# Patient Record
Sex: FEMALE | Race: WHITE | NOT HISPANIC OR LATINO | Employment: UNEMPLOYED | URBAN - METROPOLITAN AREA
[De-identification: names, ages, dates, MRNs, and addresses within clinical notes are randomized per-mention and may not be internally consistent; named-entity substitution may affect disease eponyms.]

---

## 2020-06-17 ENCOUNTER — TELEPHONE (OUTPATIENT)
Dept: RHEUMATOLOGY | Facility: CLINIC | Age: 45
End: 2020-06-17

## 2020-06-17 NOTE — TELEPHONE ENCOUNTER
Patient advised that Dr. Madera is not taking any new outside medicaid at this time. She was informed that she would need a referral placed by an [facility] provider being that she is new patient. Patient was given the phone number to patient relations per request

## 2020-06-17 NOTE — TELEPHONE ENCOUNTER
----- Message from Ana Peraza sent at 6/17/2020  2:09 PM CDT -----  Regarding: New Patient Appointment  Type:  New Patient Appointment Request    Caller is requesting a same day appointment.  Caller declined first available appointment listed below.      Name of Caller:  Patient  When is the first available appointment?  N/a  Symptoms:  arthritis.  Best Call Back Number:  [phone]  Additional Information:      States DIAN Solomon sent over a referral yesterday.

## 2022-01-20 ENCOUNTER — TELEPHONE (OUTPATIENT)
Dept: GASTROENTEROLOGY | Facility: CLINIC | Age: 47
End: 2022-01-20
Payer: MEDICAID

## 2022-01-20 ENCOUNTER — LAB VISIT (OUTPATIENT)
Dept: PRIMARY CARE CLINIC | Facility: CLINIC | Age: 47
End: 2022-01-20
Payer: MEDICAID

## 2022-01-20 ENCOUNTER — PATIENT MESSAGE (OUTPATIENT)
Dept: RHEUMATOLOGY | Facility: CLINIC | Age: 47
End: 2022-01-20
Payer: MEDICAID

## 2022-01-20 ENCOUNTER — PATIENT MESSAGE (OUTPATIENT)
Dept: PRIMARY CARE CLINIC | Facility: CLINIC | Age: 47
End: 2022-01-20

## 2022-01-20 ENCOUNTER — TELEPHONE (OUTPATIENT)
Dept: RHEUMATOLOGY | Facility: CLINIC | Age: 47
End: 2022-01-20
Payer: MEDICAID

## 2022-01-20 ENCOUNTER — OFFICE VISIT (OUTPATIENT)
Dept: PRIMARY CARE CLINIC | Facility: CLINIC | Age: 47
End: 2022-01-20
Payer: MEDICAID

## 2022-01-20 VITALS
OXYGEN SATURATION: 97 % | WEIGHT: 251.31 LBS | BODY MASS INDEX: 41.87 KG/M2 | SYSTOLIC BLOOD PRESSURE: 130 MMHG | HEIGHT: 65 IN | DIASTOLIC BLOOD PRESSURE: 84 MMHG | HEART RATE: 94 BPM

## 2022-01-20 DIAGNOSIS — Z12.31 SCREENING MAMMOGRAM, ENCOUNTER FOR: ICD-10-CM

## 2022-01-20 DIAGNOSIS — Z12.11 COLON CANCER SCREENING: ICD-10-CM

## 2022-01-20 DIAGNOSIS — Z00.00 ANNUAL PHYSICAL EXAM: ICD-10-CM

## 2022-01-20 DIAGNOSIS — M54.50 CHRONIC MIDLINE LOW BACK PAIN, UNSPECIFIED WHETHER SCIATICA PRESENT: ICD-10-CM

## 2022-01-20 DIAGNOSIS — L40.50 PSORIATIC ARTHRITIS: ICD-10-CM

## 2022-01-20 DIAGNOSIS — K42.9 UMBILICAL HERNIA WITHOUT OBSTRUCTION AND WITHOUT GANGRENE: ICD-10-CM

## 2022-01-20 DIAGNOSIS — M79.7 FIBROMYALGIA: ICD-10-CM

## 2022-01-20 DIAGNOSIS — E03.9 HYPOTHYROIDISM, UNSPECIFIED TYPE: ICD-10-CM

## 2022-01-20 DIAGNOSIS — M25.559 HIP PAIN: ICD-10-CM

## 2022-01-20 DIAGNOSIS — L40.50 PSORIATIC ARTHRITIS: Primary | ICD-10-CM

## 2022-01-20 DIAGNOSIS — G89.4 CHRONIC PAIN SYNDROME: ICD-10-CM

## 2022-01-20 DIAGNOSIS — G89.29 CHRONIC MIDLINE LOW BACK PAIN, UNSPECIFIED WHETHER SCIATICA PRESENT: ICD-10-CM

## 2022-01-20 DIAGNOSIS — F41.1 GAD (GENERALIZED ANXIETY DISORDER): ICD-10-CM

## 2022-01-20 DIAGNOSIS — F33.2 SEVERE EPISODE OF RECURRENT MAJOR DEPRESSIVE DISORDER, WITHOUT PSYCHOTIC FEATURES: ICD-10-CM

## 2022-01-20 DIAGNOSIS — G43.809 OTHER MIGRAINE WITHOUT STATUS MIGRAINOSUS, NOT INTRACTABLE: ICD-10-CM

## 2022-01-20 DIAGNOSIS — Z23 ENCOUNTER FOR ADMINISTRATION OF VACCINE: ICD-10-CM

## 2022-01-20 PROBLEM — R70.0 ESR RAISED: Status: ACTIVE | Noted: 2022-01-20

## 2022-01-20 LAB
ALBUMIN SERPL BCP-MCNC: 3.1 G/DL (ref 3.5–5.2)
ALP SERPL-CCNC: 164 U/L (ref 55–135)
ALT SERPL W/O P-5'-P-CCNC: 11 U/L (ref 10–44)
ANION GAP SERPL CALC-SCNC: 10 MMOL/L (ref 8–16)
AST SERPL-CCNC: 8 U/L (ref 10–40)
BASOPHILS # BLD AUTO: 0.05 K/UL (ref 0–0.2)
BASOPHILS NFR BLD: 0.4 % (ref 0–1.9)
BILIRUB SERPL-MCNC: 0.3 MG/DL (ref 0.1–1)
BUN SERPL-MCNC: 15 MG/DL (ref 6–20)
CALCIUM SERPL-MCNC: 9.3 MG/DL (ref 8.7–10.5)
CHLORIDE SERPL-SCNC: 99 MMOL/L (ref 95–110)
CHOLEST SERPL-MCNC: 147 MG/DL (ref 120–199)
CHOLEST/HDLC SERPL: 2.8 {RATIO} (ref 2–5)
CO2 SERPL-SCNC: 30 MMOL/L (ref 23–29)
CREAT SERPL-MCNC: 0.7 MG/DL (ref 0.5–1.4)
CRP SERPL-MCNC: 27.7 MG/L (ref 0–8.2)
DIFFERENTIAL METHOD: ABNORMAL
EOSINOPHIL # BLD AUTO: 0.1 K/UL (ref 0–0.5)
EOSINOPHIL NFR BLD: 0.5 % (ref 0–8)
ERYTHROCYTE [DISTWIDTH] IN BLOOD BY AUTOMATED COUNT: 15.4 % (ref 11.5–14.5)
ERYTHROCYTE [SEDIMENTATION RATE] IN BLOOD BY WESTERGREN METHOD: 94 MM/HR (ref 0–36)
EST. GFR  (AFRICAN AMERICAN): >60 ML/MIN/1.73 M^2
EST. GFR  (NON AFRICAN AMERICAN): >60 ML/MIN/1.73 M^2
ESTIMATED AVG GLUCOSE: 103 MG/DL (ref 68–131)
GLUCOSE SERPL-MCNC: 77 MG/DL (ref 70–110)
HBA1C MFR BLD: 5.2 % (ref 4–5.6)
HCT VFR BLD AUTO: 41 % (ref 37–48.5)
HDLC SERPL-MCNC: 52 MG/DL (ref 40–75)
HDLC SERPL: 35.4 % (ref 20–50)
HGB BLD-MCNC: 12.9 G/DL (ref 12–16)
IMM GRANULOCYTES # BLD AUTO: 0.08 K/UL (ref 0–0.04)
IMM GRANULOCYTES NFR BLD AUTO: 0.6 % (ref 0–0.5)
LDLC SERPL CALC-MCNC: 81.4 MG/DL (ref 63–159)
LYMPHOCYTES # BLD AUTO: 3.1 K/UL (ref 1–4.8)
LYMPHOCYTES NFR BLD: 24.1 % (ref 18–48)
MCH RBC QN AUTO: 30.1 PG (ref 27–31)
MCHC RBC AUTO-ENTMCNC: 31.5 G/DL (ref 32–36)
MCV RBC AUTO: 96 FL (ref 82–98)
MONOCYTES # BLD AUTO: 0.6 K/UL (ref 0.3–1)
MONOCYTES NFR BLD: 4.5 % (ref 4–15)
NEUTROPHILS # BLD AUTO: 9 K/UL (ref 1.8–7.7)
NEUTROPHILS NFR BLD: 69.9 % (ref 38–73)
NONHDLC SERPL-MCNC: 95 MG/DL
NRBC BLD-RTO: 0 /100 WBC
PLATELET # BLD AUTO: 409 K/UL (ref 150–450)
PMV BLD AUTO: 9.9 FL (ref 9.2–12.9)
POTASSIUM SERPL-SCNC: 5 MMOL/L (ref 3.5–5.1)
PROT SERPL-MCNC: 7.3 G/DL (ref 6–8.4)
RBC # BLD AUTO: 4.29 M/UL (ref 4–5.4)
SODIUM SERPL-SCNC: 139 MMOL/L (ref 136–145)
T4 FREE SERPL-MCNC: 0.92 NG/DL (ref 0.71–1.51)
TRIGL SERPL-MCNC: 68 MG/DL (ref 30–150)
TSH SERPL DL<=0.005 MIU/L-ACNC: 5.58 UIU/ML (ref 0.4–4)
WBC # BLD AUTO: 12.89 K/UL (ref 3.9–12.7)

## 2022-01-20 PROCEDURE — 80053 COMPREHEN METABOLIC PANEL: CPT | Performed by: FAMILY MEDICINE

## 2022-01-20 PROCEDURE — 3075F PR MOST RECENT SYSTOLIC BLOOD PRESS GE 130-139MM HG: ICD-10-PCS | Mod: CPTII,,, | Performed by: FAMILY MEDICINE

## 2022-01-20 PROCEDURE — 99204 PR OFFICE/OUTPT VISIT, NEW, LEVL IV, 45-59 MIN: ICD-10-PCS | Mod: S$PBB,,, | Performed by: FAMILY MEDICINE

## 2022-01-20 PROCEDURE — 3008F PR BODY MASS INDEX (BMI) DOCUMENTED: ICD-10-PCS | Mod: CPTII,,, | Performed by: FAMILY MEDICINE

## 2022-01-20 PROCEDURE — 1160F PR REVIEW ALL MEDS BY PRESCRIBER/CLIN PHARMACIST DOCUMENTED: ICD-10-PCS | Mod: CPTII,,, | Performed by: FAMILY MEDICINE

## 2022-01-20 PROCEDURE — 80061 LIPID PANEL: CPT | Performed by: FAMILY MEDICINE

## 2022-01-20 PROCEDURE — 84443 ASSAY THYROID STIM HORMONE: CPT | Performed by: FAMILY MEDICINE

## 2022-01-20 PROCEDURE — 1160F RVW MEDS BY RX/DR IN RCRD: CPT | Mod: CPTII,,, | Performed by: FAMILY MEDICINE

## 2022-01-20 PROCEDURE — 36415 COLL VENOUS BLD VENIPUNCTURE: CPT | Mod: PBBFAC,PN

## 2022-01-20 PROCEDURE — 3079F PR MOST RECENT DIASTOLIC BLOOD PRESSURE 80-89 MM HG: ICD-10-PCS | Mod: CPTII,,, | Performed by: FAMILY MEDICINE

## 2022-01-20 PROCEDURE — 99999 PR PBB SHADOW E&M-EST. PATIENT-LVL IV: CPT | Mod: PBBFAC,,, | Performed by: FAMILY MEDICINE

## 2022-01-20 PROCEDURE — 99999 PR PBB SHADOW E&M-EST. PATIENT-LVL IV: ICD-10-PCS | Mod: PBBFAC,,, | Performed by: FAMILY MEDICINE

## 2022-01-20 PROCEDURE — 3075F SYST BP GE 130 - 139MM HG: CPT | Mod: CPTII,,, | Performed by: FAMILY MEDICINE

## 2022-01-20 PROCEDURE — 90471 IMMUNIZATION ADMIN: CPT | Mod: PBBFAC,PN

## 2022-01-20 PROCEDURE — 3079F DIAST BP 80-89 MM HG: CPT | Mod: CPTII,,, | Performed by: FAMILY MEDICINE

## 2022-01-20 PROCEDURE — 85025 COMPLETE CBC W/AUTO DIFF WBC: CPT | Performed by: FAMILY MEDICINE

## 2022-01-20 PROCEDURE — 84439 ASSAY OF FREE THYROXINE: CPT | Performed by: FAMILY MEDICINE

## 2022-01-20 PROCEDURE — 99214 OFFICE O/P EST MOD 30 MIN: CPT | Mod: PBBFAC,PN | Performed by: FAMILY MEDICINE

## 2022-01-20 PROCEDURE — 90714 TD VACC NO PRESV 7 YRS+ IM: CPT | Mod: PBBFAC,PN

## 2022-01-20 PROCEDURE — 83036 HEMOGLOBIN GLYCOSYLATED A1C: CPT | Performed by: FAMILY MEDICINE

## 2022-01-20 PROCEDURE — 86140 C-REACTIVE PROTEIN: CPT | Performed by: FAMILY MEDICINE

## 2022-01-20 PROCEDURE — 85652 RBC SED RATE AUTOMATED: CPT | Performed by: FAMILY MEDICINE

## 2022-01-20 PROCEDURE — 3008F BODY MASS INDEX DOCD: CPT | Mod: CPTII,,, | Performed by: FAMILY MEDICINE

## 2022-01-20 PROCEDURE — 1159F PR MEDICATION LIST DOCUMENTED IN MEDICAL RECORD: ICD-10-PCS | Mod: CPTII,,, | Performed by: FAMILY MEDICINE

## 2022-01-20 PROCEDURE — 99204 OFFICE O/P NEW MOD 45 MIN: CPT | Mod: S$PBB,,, | Performed by: FAMILY MEDICINE

## 2022-01-20 PROCEDURE — 1159F MED LIST DOCD IN RCRD: CPT | Mod: CPTII,,, | Performed by: FAMILY MEDICINE

## 2022-01-20 RX ORDER — SUMATRIPTAN 50 MG/1
TABLET, FILM COATED ORAL
Qty: 20 TABLET | Refills: 2 | Status: SHIPPED | OUTPATIENT
Start: 2022-01-20 | End: 2022-02-03

## 2022-01-20 RX ORDER — FLUOXETINE 10 MG/1
10 CAPSULE ORAL DAILY
COMMUNITY
Start: 2022-01-10 | End: 2023-08-02

## 2022-01-20 RX ORDER — CYCLOBENZAPRINE HCL 10 MG
10 TABLET ORAL 3 TIMES DAILY PRN
Qty: 30 TABLET | Refills: 3 | Status: SHIPPED | OUTPATIENT
Start: 2022-01-20 | End: 2023-11-16 | Stop reason: SDUPTHER

## 2022-01-20 RX ORDER — FLUOXETINE HYDROCHLORIDE 20 MG/1
20 CAPSULE ORAL DAILY
COMMUNITY
Start: 2021-12-22 | End: 2023-08-02

## 2022-01-20 RX ORDER — ONDANSETRON 4 MG/1
4 TABLET, ORALLY DISINTEGRATING ORAL EVERY 8 HOURS PRN
Qty: 30 TABLET | Refills: 1 | Status: SHIPPED | OUTPATIENT
Start: 2022-01-20 | End: 2022-04-01 | Stop reason: SDUPTHER

## 2022-01-20 RX ORDER — LEVOTHYROXINE SODIUM 150 UG/1
TABLET ORAL
COMMUNITY
End: 2022-01-31

## 2022-01-20 RX ORDER — BUPROPION HYDROCHLORIDE 150 MG/1
150 TABLET ORAL EVERY MORNING
COMMUNITY
Start: 2021-12-22 | End: 2023-08-02

## 2022-01-20 RX ORDER — BUPRENORPHINE AND NALOXONE 8; 2 MG/1; MG/1
0.25 FILM, SOLUBLE BUCCAL; SUBLINGUAL 3 TIMES DAILY
COMMUNITY
Start: 2022-01-11

## 2022-01-20 NOTE — TELEPHONE ENCOUNTER
Pt states that she plans to have her colonoscopy later in the year. She will call us back at a later time to schedule. Phone number provided.

## 2022-01-20 NOTE — Clinical Note
Can you call this office:   Flex Joiner MD Psychiatry [phone] [facility]  Can you have this staff or physician call my number at [phone]. We have a mutual patient, and I would like to discuss the treatment plan for this patient a little further.

## 2022-01-20 NOTE — PROGRESS NOTES
Clinic Note  2022      Subjective:       Patient ID:  Sami is a 46 y.o. female being seen for an new visit.      Chief Complaint: Establish Care, Low-back Pain, and Umbilical Hernia    Establish care - lives in [place]. Being followed by psychiatrist, therapist, pain management, and suboxone therapy.  for 21+ years    Psoriatic arthritis - diagnosed by rheumatologist since  by Dr. Madera, trying to establish care with another rheumatologist    Hypothyroidism - on synthroid 150 mcg daily    Chronic pain / fibromyalgia - Currently getting Suboxone therapy. Patient reports lying to physician stating that she was addicted to narcotics in order to qualify for suboxone therapy. Was told that she shouldn't be on gabapentin while being on suboxone. Lyrica has helped significantly in the past    Lower back pain - getting back and hip injections by pain specialist    Umbilical hernia - present for years, does not really bother patient     Migraines - would like zofran prn for nausea and imitrex prn for migraines    BERTRAM/depression - being followed by psychiatrist, currently on prozac and welbutrin. Depression better controlled, anxiety not so much       History reviewed. No pertinent family history.  Social History     Socioeconomic History    Marital status: Unknown     Past Surgical History:   Procedure Laterality Date     SECTION      HYSTERECTOMY      TONSILLECTOMY      TUMOR REMOVAL      fibroid     Medication List with Changes/Refills   New Medications    CYCLOBENZAPRINE (FLEXERIL) 10 MG TABLET    Take 1 tablet (10 mg total) by mouth 3 (three) times daily as needed for Muscle spasms (chronic pain).    ONDANSETRON (ZOFRAN-ODT) 4 MG TBDL    Take 1 tablet (4 mg total) by mouth every 8 (eight) hours as needed (nausea).    SUMATRIPTAN (IMITREX) 50 MG TABLET    50 mg once. If initial dose was partially effective or headache recurs, may repeat a dose (usually same as first dose) after =2 hours. Max  "200 mg per 24 hours.   Current Medications    BUPROPION (WELLBUTRIN XL) 150 MG TB24 TABLET    Take 150 mg by mouth every morning.    FLUOXETINE 10 MG CAPSULE    Take 10 mg by mouth once daily.    FLUOXETINE 20 MG CAPSULE    Take 20 mg by mouth once daily.    LEVOTHYROXINE (SYNTHROID) 150 MCG TABLET    levothyroxine 150 mcg tablet   TAKE ONE TABLET BY MOUTH EVERY DAY NEED TO REPEAT LABS    SUBOXONE 8-2 MG    SMARTSI.5 Strip(s) Sublingual 3 Times Daily     Patient Active Problem List   Diagnosis    Psoriatic arthritis    Osteoarthritis of lumbar spine    Hypothyroidism    ESR raised    Endometriosis of uterus     Review of Systems   Constitutional: Negative for chills, fever, malaise/fatigue and weight loss.   HENT: Negative for congestion, sinus pain and sore throat.    Respiratory: Negative for cough, shortness of breath and wheezing.    Cardiovascular: Negative for chest pain and palpitations.   Gastrointestinal: Negative for constipation, diarrhea, nausea and vomiting.   Genitourinary: Negative for dysuria, frequency and urgency.   Musculoskeletal: Positive for back pain. Negative for myalgias.   Skin: Negative for rash.   Neurological: Negative for headaches.   Psychiatric/Behavioral: Positive for depression. The patient is nervous/anxious.          Objective:      /84 (BP Location: Left arm, Patient Position: Sitting, BP Method: Large (Manual))   Pulse 94   Ht 5' 5" (1.651 m)   Wt 114 kg (251 lb 5.2 oz)   SpO2 97%   BMI 41.82 kg/m²   Estimated body mass index is 41.82 kg/m² as calculated from the following:    Height as of this encounter: 5' 5" (1.651 m).    Weight as of this encounter: 114 kg (251 lb 5.2 oz).  Physical Exam  Vitals reviewed.   Constitutional:       General: She is not in acute distress.     Appearance: She is obese. She is not diaphoretic.   HENT:      Head: Normocephalic and atraumatic.   Eyes:      Conjunctiva/sclera: Conjunctivae normal.   Cardiovascular:      Rate and " Rhythm: Normal rate and regular rhythm.      Heart sounds: Normal heart sounds.   Pulmonary:      Effort: Pulmonary effort is normal. No respiratory distress.      Breath sounds: Normal breath sounds. No wheezing.   Abdominal:      General: Bowel sounds are normal.      Palpations: Abdomen is soft.       Musculoskeletal:         General: Normal range of motion.      Cervical back: Normal range of motion.   Skin:     General: Skin is warm and dry.      Findings: No erythema or rash.   Neurological:      Mental Status: She is alert and oriented to person, place, and time.   Psychiatric:         Mood and Affect: Mood and affect normal.         Behavior: Behavior normal.         Thought Content: Thought content normal.         Judgment: Judgment normal.           Assessment and Plan:     1. Psoriatic arthritis  - Sedimentation rate; Future  - C-reactive protein; Future  - Ambulatory referral/consult to Rheumatology; Future    2. Hypothyroidism, unspecified type  - continue Synthroid 150 mcg daily  - TSH; Future    3. Chronic pain syndrome / Fibromyalgia  - will start flexeril prn for this, will discuss case with Dr. Flex Joiner about stopping patient's suboxone before starting patient on lyrica again  - cyclobenzaprine (FLEXERIL) 10 MG tablet; Take 1 tablet (10 mg total) by mouth 3 (three) times daily as needed for Muscle spasms (chronic pain).  Dispense: 30 tablet; Refill: 3    5. Chronic midline low back pain, unspecified whether sciatica present / hip pain  - continue f/u with pain specialist for back and hip injections    7. Other migraine without status migrainosus, not intractable  - zofran prn for nausea, imitrex abortive therapy  - ondansetron (ZOFRAN-ODT) 4 MG TbDL; Take 1 tablet (4 mg total) by mouth every 8 (eight) hours as needed (nausea).  Dispense: 30 tablet; Refill: 1  - sumatriptan (IMITREX) 50 MG tablet; 50 mg once. If initial dose was partially effective or headache recurs, may repeat a dose  (usually same as first dose) after =2 hours. Max 200 mg per 24 hours.  Dispense: 20 tablet; Refill: 2    8. Annual physical exam  - CBC Auto Differential; Future  - Comprehensive Metabolic Panel; Future  - Lipid Panel; Future  - Hemoglobin A1C; Future    9. Screening mammogram, encounter for  - Mammo Digital Screening Bilat w/ Gumaro; Future    10. Colon cancer screening  - Case Request Endoscopy: COLONOSCOPY    11. Encounter for administration of vaccine  - Influenza - Quadrivalent (PF)  - (In Office Administered) Td Vaccine - Preservative Free    12. BERTRAM (generalized anxiety disorder) / Severe episode of recurrent major depressive disorder, without psychotic features  - continue f/u with psychiatry and therapy, continue prozac and welbutrin     14. Umbilical hernia without obstruction and without gangrene  - overall stable at this time, defer surgical therapy        Follow up:   No follow-ups on file.     Other Orders Placed This Visit:  Orders Placed This Encounter   Procedures    Mammo Digital Screening Bilat w/ Gumaro     Standing Status:   Future     Standing Expiration Date:   1/20/2024     Order Specific Question:   May the Radiologist modify the order per protocol to meet the clinical needs of the patient?     Answer:   Yes     Order Specific Question:   Release to patient     Answer:   Immediate    Influenza - Quadrivalent (PF)    (In Office Administered) Td Vaccine - Preservative Free    CBC Auto Differential     Standing Status:   Future     Number of Occurrences:   1     Standing Expiration Date:   1/20/2023    Comprehensive Metabolic Panel     Standing Status:   Future     Number of Occurrences:   1     Standing Expiration Date:   1/20/2023    Lipid Panel     Standing Status:   Future     Number of Occurrences:   1     Standing Expiration Date:   1/20/2023    Hemoglobin A1C     Standing Status:   Future     Number of Occurrences:   1     Standing Expiration Date:   1/20/2023    TSH     Standing  Status:   Future     Number of Occurrences:   1     Standing Expiration Date:   1/20/2023    Sedimentation rate           Standing Status:   Future     Number of Occurrences:   1     Standing Expiration Date:   3/21/2023    C-reactive protein           Standing Status:   Future     Number of Occurrences:   1     Standing Expiration Date:   3/21/2023    Ambulatory referral/consult to Rheumatology     Standing Status:   Future     Standing Expiration Date:   2/20/2023     Referral Priority:   Routine     Referral Type:   Consultation     Referral Reason:   Specialty Services Required     Requested Specialty:   Rheumatology     Number of Visits Requested:   1    Case Request Endoscopy: COLONOSCOPY     Order Specific Question:   Medical Necessity:     Answer:   Medically Non-Urgent [100]     Order Specific Question:   CPT Code:     Answer:   [place] COLON CA SCRN NOT HI RSK IND []     Order Specific Question:   Case Referring Provider     Answer:   BREANA VARNER [[id number]]     Order Specific Question:   Is an on-site pathologist required for this procedure?     Answer:   N/A           Breana Varner MD        This note is dictated on M*Modal word recognition program.  There are word recognition mistakes that are occasionally missed on review.

## 2022-01-20 NOTE — TELEPHONE ENCOUNTER
----- Message from Willie Melgoza sent at 1/20/2022 11:26 AM CST -----  Regarding: Scheduling  Good morning,     Dr. Flores placed an order for the patient, the patient stated she use to be a patient of  when she had her own practice and would like to stay with her. Can you assist with scheduling please? Thanks!     Psoriatic arthritis [L40.50]

## 2022-01-21 ENCOUNTER — TELEPHONE (OUTPATIENT)
Dept: GASTROENTEROLOGY | Facility: CLINIC | Age: 47
End: 2022-01-21
Payer: MEDICAID

## 2022-01-26 DIAGNOSIS — E03.9 HYPOTHYROIDISM, UNSPECIFIED TYPE: Primary | ICD-10-CM

## 2022-01-26 DIAGNOSIS — L40.50 PSORIATIC ARTHRITIS: ICD-10-CM

## 2022-01-26 DIAGNOSIS — R74.8 ELEVATED ALKALINE PHOSPHATASE LEVEL: ICD-10-CM

## 2022-01-31 ENCOUNTER — PATIENT MESSAGE (OUTPATIENT)
Dept: PRIMARY CARE CLINIC | Facility: CLINIC | Age: 47
End: 2022-01-31
Payer: MEDICAID

## 2022-01-31 ENCOUNTER — TELEPHONE (OUTPATIENT)
Dept: ADMINISTRATIVE | Facility: OTHER | Age: 47
End: 2022-01-31
Payer: MEDICAID

## 2022-01-31 ENCOUNTER — PATIENT MESSAGE (OUTPATIENT)
Dept: ADMINISTRATIVE | Facility: CLINIC | Age: 47
End: 2022-01-31
Payer: MEDICAID

## 2022-01-31 ENCOUNTER — PATIENT MESSAGE (OUTPATIENT)
Dept: ADMINISTRATIVE | Facility: OTHER | Age: 47
End: 2022-01-31
Payer: MEDICAID

## 2022-01-31 RX ORDER — LEVOTHYROXINE SODIUM 175 UG/1
175 TABLET ORAL
Qty: 70 TABLET | Refills: 0 | Status: SHIPPED | OUTPATIENT
Start: 2022-01-31 | End: 2022-07-06 | Stop reason: SDUPTHER

## 2022-02-01 ENCOUNTER — PATIENT MESSAGE (OUTPATIENT)
Dept: ADMINISTRATIVE | Facility: CLINIC | Age: 47
End: 2022-02-01
Payer: MEDICAID

## 2022-02-02 ENCOUNTER — PATIENT MESSAGE (OUTPATIENT)
Dept: ADMINISTRATIVE | Facility: OTHER | Age: 47
End: 2022-02-02
Payer: MEDICAID

## 2022-02-02 ENCOUNTER — TELEPHONE (OUTPATIENT)
Dept: ADMINISTRATIVE | Facility: CLINIC | Age: 47
End: 2022-02-02
Payer: MEDICAID

## 2022-02-02 ENCOUNTER — NURSE TRIAGE (OUTPATIENT)
Dept: ADMINISTRATIVE | Facility: CLINIC | Age: 47
End: 2022-02-02
Payer: MEDICAID

## 2022-02-02 NOTE — TELEPHONE ENCOUNTER
Called patient due to RN escalation in COVID Surveillance program. Pt escalated due to worsening symptoms.    Patient location: [place], [place]    Vitals: Sp02: 96%. P: 90. Temp: 98.7 F  Ambulatory Sp02 on the phone (if applicable):     46 y.o. female with pertinent PMHx of hypothyroidism, psoriatic arthritis on day 8 of Covid symptoms. Positive Covid screen 1/29/22. No recent CXR. Home oxygen: no. COVID-19 Hospitalization History: none. Received antibody infusion: no. Fully vaccinated: no. Remdesivir treatment day: NA. SpO2 goal on hospital discharge: NA.     HPI: Pt escalated due to worsening symptoms of persistent headache for the past 8 days. Headache is worst when she first wakes up. She is taking ibuprofen and tylenol without much relief. Even if the headache is relieved at some point during the day, it always returns. States she ran out of imitrex days ago. Also reports loss of taste and decreased fluid intake. Mostly taking sips of soda. Urinating well. Denies fevers in the last 2 days. Reports limited family support.    ROS: Denies worsening cough, light headedness, dizziness, blurry vision, unilateral weakness, fever, chills, diaphoresis, chest pain, abdominal pain, emesis, diarrhea or further symptoms.     Assessment: Vitals appear WNL. During phone call, patient appears alert and oriented. Able to speak in full sentences without difficulty. No audible wheezing heard during call.    Plan:    -Encouraged increased water intake  -Counseled to avoid sodas and excess caffeine  -PCP outreach for worsening symptoms    Reviewed with patient the reasons for seeking emergency care. Pt aware that if Sp02 <94% or if they have any worsening symptoms, they need to go to the emergency department. If they are having a medical emergency, they will call 911. Otherwise, patient will continue to submit data as scheduled. Reviewed importance of wearing mask if self or family members leave the house.     Advised next steps: PCP  outreach     Will copy PCP on this encounter.

## 2022-02-02 NOTE — TELEPHONE ENCOUNTER
Pt escalated due to reporting 91% o2 sat, and worsening symptoms,upon rechecking o2 sat noted to be 95%. Pt reports having worsening HA pt states that she has taking tylenol with no relief of AYOUB. Nani Howe PA-C aware, and will call pt.    Reason for Disposition   HIGH RISK for severe COVID complications (e.g., age > 64 years, obesity with BMI > 25, pregnant, chronic lung disease or other chronic medical condition) (Exception: Already seen by PCP and no new or worsening symptoms.)    Additional Information   Negative: SEVERE difficulty breathing (e.g., struggling for each breath, speaks in single words)   Negative: Difficult to awaken or acting confused (e.g., disoriented, slurred speech)   Negative: Bluish (or gray) lips or face now   Negative: Shock suspected (e.g., cold/pale/clammy skin, too weak to stand, low BP, rapid pulse)   Negative: Sounds like a life-threatening emergency to the triager   Negative: SEVERE or constant chest pain or pressure (Exception: mild central chest pain, present only when coughing)   Negative: MODERATE difficulty breathing (e.g., speaks in phrases, SOB even at rest, pulse 100-120)   Negative: Headache and stiff neck (can't touch chin to chest)   Negative: Chest pain or pressure   Negative: Patient sounds very sick or weak to the triager   Negative: MILD difficulty breathing (e.g., minimal/no SOB at rest, SOB with walking, pulse <100)   Negative: Fever > 103 F (39.4 C)   Negative: [1] Fever > 101 F (38.3 C) AND [2] over 60 years of age   Negative: [1] Fever > 100.0 F (37.8 C) AND [2] bedridden (e.g., nursing home patient, CVA, chronic illness, recovering from surgery)    Protocols used: CORONAVIRUS (COVID-19) DIAGNOSED OR [id number]

## 2022-02-03 ENCOUNTER — PATIENT MESSAGE (OUTPATIENT)
Dept: ADMINISTRATIVE | Facility: OTHER | Age: 47
End: 2022-02-03
Payer: MEDICAID

## 2022-02-03 ENCOUNTER — PATIENT MESSAGE (OUTPATIENT)
Dept: PRIMARY CARE CLINIC | Facility: CLINIC | Age: 47
End: 2022-02-03
Payer: MEDICAID

## 2022-02-03 RX ORDER — SUMATRIPTAN SUCCINATE 100 MG/1
TABLET ORAL
Qty: 20 TABLET | Refills: 0 | Status: SHIPPED | OUTPATIENT
Start: 2022-02-03 | End: 2022-06-15 | Stop reason: SDUPTHER

## 2022-02-03 RX ORDER — NAPROXEN 500 MG/1
500 TABLET ORAL 2 TIMES DAILY PRN
Qty: 40 TABLET | Refills: 1 | Status: SHIPPED | OUTPATIENT
Start: 2022-02-03 | End: 2022-03-02 | Stop reason: SDUPTHER

## 2022-02-04 ENCOUNTER — PATIENT MESSAGE (OUTPATIENT)
Dept: ADMINISTRATIVE | Facility: OTHER | Age: 47
End: 2022-02-04
Payer: MEDICAID

## 2022-02-09 ENCOUNTER — PATIENT MESSAGE (OUTPATIENT)
Dept: ADMINISTRATIVE | Facility: CLINIC | Age: 47
End: 2022-02-09
Payer: MEDICAID

## 2022-02-09 DIAGNOSIS — Z11.59 NEED FOR HEPATITIS C SCREENING TEST: ICD-10-CM

## 2022-02-10 ENCOUNTER — NURSE TRIAGE (OUTPATIENT)
Dept: ADMINISTRATIVE | Facility: CLINIC | Age: 47
End: 2022-02-10
Payer: MEDICAID

## 2022-02-10 ENCOUNTER — PATIENT MESSAGE (OUTPATIENT)
Dept: ADMINISTRATIVE | Facility: CLINIC | Age: 47
End: 2022-02-10
Payer: MEDICAID

## 2022-02-10 NOTE — TELEPHONE ENCOUNTER
Pt escalated for no response. Pt has had 4 or more no responses in a row and has not responded to follow up calls or [facility] messages. Removed surveillance tasks      Reason for Disposition   Caller has cancelled the call before the first contact    Protocols used: NO CONTACT OR DUPLICATE CONTACT CALL-A-OH

## 2022-03-02 RX ORDER — NAPROXEN 500 MG/1
500 TABLET ORAL 2 TIMES DAILY PRN
Qty: 40 TABLET | Refills: 1 | Status: SHIPPED | OUTPATIENT
Start: 2022-03-02 | End: 2023-12-08

## 2022-03-10 ENCOUNTER — PATIENT MESSAGE (OUTPATIENT)
Dept: RHEUMATOLOGY | Facility: CLINIC | Age: 47
End: 2022-03-10
Payer: MEDICAID

## 2022-03-30 ENCOUNTER — TELEPHONE (OUTPATIENT)
Dept: GASTROENTEROLOGY | Facility: CLINIC | Age: 47
End: 2022-03-30
Payer: MEDICAID

## 2022-05-31 ENCOUNTER — PATIENT MESSAGE (OUTPATIENT)
Dept: PRIMARY CARE CLINIC | Facility: CLINIC | Age: 47
End: 2022-05-31
Payer: MEDICAID

## 2022-05-31 RX ORDER — ONDANSETRON 8 MG/1
8 TABLET, ORALLY DISINTEGRATING ORAL EVERY 6 HOURS PRN
Qty: 30 TABLET | Refills: 2 | Status: SHIPPED | OUTPATIENT
Start: 2022-05-31 | End: 2022-06-15 | Stop reason: SDUPTHER

## 2022-06-02 ENCOUNTER — OFFICE VISIT (OUTPATIENT)
Dept: RHEUMATOLOGY | Facility: CLINIC | Age: 47
End: 2022-06-02
Payer: MEDICAID

## 2022-06-02 VITALS
HEIGHT: 65 IN | WEIGHT: 234.31 LBS | BODY MASS INDEX: 39.04 KG/M2 | DIASTOLIC BLOOD PRESSURE: 81 MMHG | RESPIRATION RATE: 20 BRPM | OXYGEN SATURATION: 95 % | HEART RATE: 94 BPM | SYSTOLIC BLOOD PRESSURE: 130 MMHG

## 2022-06-02 DIAGNOSIS — Z71.89 COUNSELING AND COORDINATION OF CARE: ICD-10-CM

## 2022-06-02 DIAGNOSIS — M15.9 PRIMARY OSTEOARTHRITIS INVOLVING MULTIPLE JOINTS: ICD-10-CM

## 2022-06-02 DIAGNOSIS — Z79.899 ENCOUNTER FOR LONG-TERM (CURRENT) USE OF OTHER MEDICATIONS: ICD-10-CM

## 2022-06-02 DIAGNOSIS — E66.01 MORBID OBESITY: ICD-10-CM

## 2022-06-02 DIAGNOSIS — L40.50 PSORIATIC ARTHRITIS: Primary | ICD-10-CM

## 2022-06-02 DIAGNOSIS — M79.7 FIBROMYALGIA: ICD-10-CM

## 2022-06-02 PROCEDURE — 3044F PR MOST RECENT HEMOGLOBIN A1C LEVEL <7.0%: ICD-10-PCS | Mod: CPTII,,, | Performed by: INTERNAL MEDICINE

## 2022-06-02 PROCEDURE — 3044F HG A1C LEVEL LT 7.0%: CPT | Mod: CPTII,,, | Performed by: INTERNAL MEDICINE

## 2022-06-02 PROCEDURE — 3008F BODY MASS INDEX DOCD: CPT | Mod: CPTII,,, | Performed by: INTERNAL MEDICINE

## 2022-06-02 PROCEDURE — 1159F PR MEDICATION LIST DOCUMENTED IN MEDICAL RECORD: ICD-10-PCS | Mod: CPTII,,, | Performed by: INTERNAL MEDICINE

## 2022-06-02 PROCEDURE — 99204 PR OFFICE/OUTPT VISIT, NEW, LEVL IV, 45-59 MIN: ICD-10-PCS | Mod: S$PBB,,, | Performed by: INTERNAL MEDICINE

## 2022-06-02 PROCEDURE — 1159F MED LIST DOCD IN RCRD: CPT | Mod: CPTII,,, | Performed by: INTERNAL MEDICINE

## 2022-06-02 PROCEDURE — 99204 OFFICE O/P NEW MOD 45 MIN: CPT | Mod: S$PBB,,, | Performed by: INTERNAL MEDICINE

## 2022-06-02 PROCEDURE — 99214 OFFICE O/P EST MOD 30 MIN: CPT | Mod: PBBFAC,PO | Performed by: INTERNAL MEDICINE

## 2022-06-02 PROCEDURE — 3008F PR BODY MASS INDEX (BMI) DOCUMENTED: ICD-10-PCS | Mod: CPTII,,, | Performed by: INTERNAL MEDICINE

## 2022-06-02 PROCEDURE — 99999 PR PBB SHADOW E&M-EST. PATIENT-LVL IV: CPT | Mod: PBBFAC,,, | Performed by: INTERNAL MEDICINE

## 2022-06-02 PROCEDURE — 99999 PR PBB SHADOW E&M-EST. PATIENT-LVL IV: ICD-10-PCS | Mod: PBBFAC,,, | Performed by: INTERNAL MEDICINE

## 2022-06-02 RX ORDER — FOLIC ACID 1 MG/1
1 TABLET ORAL DAILY
Qty: 30 TABLET | Refills: 4 | Status: SHIPPED | OUTPATIENT
Start: 2022-06-02 | End: 2022-09-15 | Stop reason: SDUPTHER

## 2022-06-02 RX ORDER — METHOTREXATE 25 MG/ML
25 INJECTION, SOLUTION INTRA-ARTERIAL; INTRAMUSCULAR; INTRAVENOUS
Qty: 4 ML | Refills: 6 | Status: SHIPPED | OUTPATIENT
Start: 2022-06-02 | End: 2022-09-15 | Stop reason: SDUPTHER

## 2022-06-02 NOTE — PROGRESS NOTES
RHEUMATOLOGY OUTPATIENT CLINIC NOTE    6/2/2022    Attending Rheumatologist: Jerrod Fernandez  Primary Care Provider: Jeremiah Varner MD   Physician Requesting Consultation: Jeremiah Varner MD  [address] [place] [address]  Chief Complaint/Reason For Consultation:  No chief complaint on file.      Subjective:       HPI  Sami Flowers is a 47 y.o. White female with medical history noted below who presents for evaluation of PsA.     Patient presents for evaluation of PsA. Patient diagnosed approximately 15 years ago. Prior therapies: MTX, Otezla and Humira. She was lost to follow up, and had insurance switch and has not seen a Rheumatologist in 3 years. Worse joints: Neck, Low back, hips, knees. +Swelling, Morning stiffness lasting hours. Pain improves with meds (Lyrica-stopped when she switched Pain docs), steroids injections. Pain worsens with prolonged standing or activity. +Rash, dactylitis, ankle swelling (no specific achilles enthesitis), Hx of Eye inflammation, constipation, HA, poor sleep, brain fog/forgeful, paraesthesias, myalgias, +FHX of PsO. No IBD, Randa.    Review of Systems   Constitutional: Positive for fatigue. Negative for chills, fever and unexpected weight change.   HENT: Negative for mouth sores and trouble swallowing.    Eyes: Positive for redness. Negative for eye dryness.   Respiratory: Negative for cough and shortness of breath.    Cardiovascular: Negative for chest pain.   Gastrointestinal: Positive for constipation. Negative for abdominal distention, diarrhea, nausea and vomiting.   Genitourinary: Negative for dysuria, genital sores and vaginal dryness.   Musculoskeletal: Positive for arthralgias, back pain, joint swelling, leg pain, myalgias, neck pain and neck stiffness. Negative for gait problem and joint deformity.   Integumentary:  Negative for rash.   Neurological: Positive for numbness and headaches. Negative for weakness.   Hematological: Negative for  adenopathy. Bruises/bleeds easily.   Psychiatric/Behavioral: Positive for decreased concentration and sleep disturbance. Negative for confusion. The patient is nervous/anxious.    All other systems reviewed and are negative.       Chronic comorbid conditions affecting medical decision making today:  No past medical history on file.  Past Surgical History:   Procedure Laterality Date     SECTION      HYSTERECTOMY      TONSILLECTOMY      TUMOR REMOVAL      fibroid     No family history on file.  Social History     Substance and Sexual Activity   Alcohol Use None     Social History     Tobacco Use   Smoking Status Never Smoker   Smokeless Tobacco Not on file     Social History     Substance and Sexual Activity   Drug Use Not on file       Current Outpatient Medications:     buPROPion (WELLBUTRIN XL) 150 MG TB24 tablet, Take 150 mg by mouth every morning., Disp: , Rfl:     cyclobenzaprine (FLEXERIL) 10 MG tablet, Take 1 tablet (10 mg total) by mouth 3 (three) times daily as needed for Muscle spasms (chronic pain)., Disp: 30 tablet, Rfl: 3    FLUoxetine 10 MG capsule, Take 10 mg by mouth once daily., Disp: , Rfl:     FLUoxetine 20 MG capsule, Take 20 mg by mouth once daily., Disp: , Rfl:     folic acid (FOLVITE) 1 MG tablet, Take 1 tablet (1 mg total) by mouth once daily., Disp: 30 tablet, Rfl: 4    levothyroxine (SYNTHROID, LEVOTHROID) 175 MCG tablet, Take 1 tablet (175 mcg total) by mouth before breakfast. thyroid, Disp: 70 tablet, Rfl: 0    methotrexate 25 mg/mL injection, Inject 1 mL (25 mg total) into the skin every 7 days., Disp: 4 mL, Rfl: 6    naproxen (NAPROSYN) 500 MG tablet, Take 1 tablet (500 mg total) by mouth 2 (two) times daily as needed (headaches (take with meals, do not take with ibuprofen))., Disp: 40 tablet, Rfl: 1    ondansetron (ZOFRAN-ODT) 8 MG TbDL, Take 1 tablet (8 mg total) by mouth every 6 (six) hours as needed (nausea/vomiting)., Disp: 30 tablet, Rfl: 2    pulse oximeter  (PULSE OXIMETER) device, by Apply Externally route 2 (two) times a day. Use twice daily at 8 AM and 3 PM and record the value in [facility] as directed., Disp: 1 each, Rfl: 0    SUBOXONE 8-2 mg, SMARTSI.5 Strip(s) Sublingual 3 Times Daily, Disp: , Rfl:     sumatriptan (IMITREX) 100 MG tablet, 100 mg as a single dose. If symptoms persist or return, may repeat dose after =2 hours. Maximum dose: 100 mg/dose; 200 mg per 24 hours, Disp: 20 tablet, Rfl: 0     Objective:         Vitals:    22 1359   BP: 130/81   Pulse: 94   Resp: 20     Physical Exam   Musculoskeletal:      Comments: Can make fist, no synovitis though multiple MCP/PIP with TTP  Knee Crepitus   Hip and Lumbar ROM oK  Ankle with TTP  Negative MTP Squeeze test   Tender Points:  No   Yes  ( )    (x )   Low cervical anterior aspect    ( )    (x )   Costochondral Junction   ( )    (x )   Lateral Epicondyle  ( )    (x )   Suboccipital   ( )    (x )   Trapezius   ( )    (x )   Supraspinatus   ( )    (x )   Gluteal   ( )    (x )   Greater trochanter  ( )    (x )   Knee       Skin: Rash noted.       Reviewed old and all outside pertinent medical records available.    All lab results personally reviewed and interpreted by me.  Lab Results   Component Value Date    WBC 12.89 (H) 2022    HGB 12.9 2022    HCT 41.0 2022    MCV 96 2022    MCH 30.1 2022    MCHC 31.5 (L) 2022    RDW 15.4 (H) 2022     2022    MPV 9.9 2022       Lab Results   Component Value Date     2022    K 5.0 2022    CL 99 2022    CO2 30 (H) 2022    GLU 77 2022    BUN 15 2022    CALCIUM 9.3 2022    PROT 7.3 2022    ALBUMIN 3.1 (L) 2022    BILITOT 0.3 2022    AST 8 (L) 2022    ALKPHOS 164 (H) 2022    ALT 11 2022       No results found for: COLORU, APPEARANCEUA, SPECGRAV, PHUR, PHUA, PROTEINUA, GLUCOSEU, KETONESU, BLOODU, LEUKOCYTESUR, NITRITE,  UROBILINOGEN    Lab Results   Component Value Date    CRP 27.7 (H) 01/20/2022       Lab Results   Component Value Date    SEDRATE 94 (H) 01/20/2022       Lab Results   Component Value Date    SEDRATE 94 (H) 01/20/2022       No components found for: 25OHVITDTOT, 70CFJZRU9, 83KLBMIB6, METHODNOTE    No results found for: URICACID    No components found for: TSPOTTB        Imaging:  All imaging reviewed and independently interpreted by me.         ASSESSMENT / PLAN:     Sami Flowers is a 47 y.o. White female with:      1. Psoriatic arthritis  - patient with Hx of PsA  - has multiple joints tender   - will start MTX 25mg subq, daily FA, SE discussed  - update labs and imaging  - reassurance   - C-Reactive Protein; Future  - FELIX Screen w/Reflex; Future  - Rheumatoid Factor; Future  - CBC Auto Differential; Future  - Comprehensive Metabolic Panel; Future  - Sedimentation rate; Future  - Hepatitis B Surface Ab, Qualitative; Future  - Vitamin D; Future  - Uric Acid; Future  - TSH; Future  - Hepatitis C Antibody; Future  - Hepatitis B Surface Antigen; Future  - XR Arthritis Survey; Future  - HLA B27 Antigen; Future  - X-Ray Sacroiliac Joints Complete; Future  - Quantiferon Gold TB; Future  - HIV 1/2 Ag/Ab (4th Gen); Future  - methotrexate 25 mg/mL injection; Inject 1 mL (25 mg total) into the skin every 7 days.  Dispense: 4 mL; Refill: 6  - folic acid (FOLVITE) 1 MG tablet; Take 1 tablet (1 mg total) by mouth once daily.  Dispense: 30 tablet; Refill: 4    2. Primary osteoarthritis involving multiple joints  - patient likely also has OA  - will get imaging  - wt loss  - Tylenol PRN   - reassurance and exercise     3. Fibromyalgia  - in addition to above she has FM which will make her pain goals harder to achieve  - she notes in the past benefit from Lyrica, but at this time is under the care of Pain who has her on Suboxone, advised to discuss with him   - sleep hygiene and stress management discussed  -  reassurance and exercise     4. Encounter for long-term (current) use of other medications  - discussed med SE  - daily FA  - monitor Labs     5. Other specified counseling  - over 10 minutes spent regarding below topics:  - Immunization counseling done.  - Weight loss counseling done.  - Nutrition and exercise counseling.  - Limitation of alcohol consumption.  - Regular exercise:  Aerobic and resistance.  - Medication counseling provided.    6. Morbid Obesity  - would benefit from decreasing at least 10% of body weight.  - recommended goal of losing 1 lb per week.  - consider nutritionist evaluation.  - would consider screening for DARINEL per PMD.    Follow up in about 3 months (around 9/2/2022).    Method of contact with patient concerns: [facility]    Disclaimer:  This note is prepared using voice recognition software and as such is likely to have errors and has not been proof read. Please contact me for questions.     Time spent: 45 minutes in face to face discussion concerning diagnosis, prognosis, review of lab and test results, benefits of treatment as well as management of disease, counseling of patient and coordination of care between various health care providers.  Greater than half the time spent was used for coordination of care and counseling of patient.    Jerrod Fernandez M.D.  Rheumatology Department   [facility]

## 2022-06-07 ENCOUNTER — PATIENT MESSAGE (OUTPATIENT)
Dept: PRIMARY CARE CLINIC | Facility: CLINIC | Age: 47
End: 2022-06-07
Payer: MEDICAID

## 2022-06-15 RX ORDER — SUMATRIPTAN SUCCINATE 100 MG/1
TABLET ORAL
Qty: 20 TABLET | Refills: 2 | Status: SHIPPED | OUTPATIENT
Start: 2022-06-15 | End: 2023-01-17

## 2022-06-15 RX ORDER — ONDANSETRON 8 MG/1
8 TABLET, ORALLY DISINTEGRATING ORAL EVERY 6 HOURS PRN
Qty: 30 TABLET | Refills: 2 | Status: SHIPPED | OUTPATIENT
Start: 2022-06-15 | End: 2022-09-01 | Stop reason: SDUPTHER

## 2022-07-06 RX ORDER — LEVOTHYROXINE SODIUM 175 UG/1
175 TABLET ORAL
Qty: 70 TABLET | Refills: 3 | Status: SHIPPED | OUTPATIENT
Start: 2022-07-06 | End: 2023-05-23

## 2022-07-25 ENCOUNTER — PATIENT MESSAGE (OUTPATIENT)
Dept: PRIMARY CARE CLINIC | Facility: CLINIC | Age: 47
End: 2022-07-25
Payer: MEDICAID

## 2022-08-04 ENCOUNTER — PATIENT MESSAGE (OUTPATIENT)
Dept: RHEUMATOLOGY | Facility: CLINIC | Age: 47
End: 2022-08-04
Payer: MEDICAID

## 2022-08-04 ENCOUNTER — HOSPITAL ENCOUNTER (OUTPATIENT)
Dept: RADIOLOGY | Facility: HOSPITAL | Age: 47
Discharge: HOME | End: 2022-08-04
Attending: INTERNAL MEDICINE
Payer: MEDICAID

## 2022-08-04 DIAGNOSIS — L40.50 PSORIATIC ARTHRITIS: ICD-10-CM

## 2022-08-04 PROCEDURE — 72202 XR SACROILIAC JOINTS COMPLETE: ICD-10-PCS | Mod: 26,,, | Performed by: RADIOLOGY

## 2022-08-04 PROCEDURE — 77077 JOINT SURVEY SINGLE VIEW: CPT | Mod: 26,,, | Performed by: RADIOLOGY

## 2022-08-04 PROCEDURE — 77077 JOINT SURVEY SINGLE VIEW: CPT | Mod: TC,PO

## 2022-08-04 PROCEDURE — 72202 X-RAY EXAM SI JOINTS 3/> VWS: CPT | Mod: 26,,, | Performed by: RADIOLOGY

## 2022-08-04 PROCEDURE — 72202 X-RAY EXAM SI JOINTS 3/> VWS: CPT | Mod: TC,FY,PO

## 2022-08-04 PROCEDURE — 77077 XR ARTHRITIS SURVEY: ICD-10-PCS | Mod: 26,,, | Performed by: RADIOLOGY

## 2022-09-15 ENCOUNTER — OFFICE VISIT (OUTPATIENT)
Dept: RHEUMATOLOGY | Facility: CLINIC | Age: 47
End: 2022-09-15
Payer: MEDICAID

## 2022-09-15 VITALS
DIASTOLIC BLOOD PRESSURE: 62 MMHG | RESPIRATION RATE: 18 BRPM | SYSTOLIC BLOOD PRESSURE: 107 MMHG | OXYGEN SATURATION: 96 % | BODY MASS INDEX: 36.4 KG/M2 | WEIGHT: 218.5 LBS | HEART RATE: 88 BPM | HEIGHT: 65 IN

## 2022-09-15 DIAGNOSIS — Z79.899 ENCOUNTER FOR LONG-TERM (CURRENT) USE OF OTHER MEDICATIONS: ICD-10-CM

## 2022-09-15 DIAGNOSIS — Z71.89 COUNSELING AND COORDINATION OF CARE: ICD-10-CM

## 2022-09-15 DIAGNOSIS — E55.9 VITAMIN D DEFICIENCY: ICD-10-CM

## 2022-09-15 DIAGNOSIS — M15.9 PRIMARY OSTEOARTHRITIS INVOLVING MULTIPLE JOINTS: ICD-10-CM

## 2022-09-15 DIAGNOSIS — Z79.1 NSAID LONG-TERM USE: ICD-10-CM

## 2022-09-15 DIAGNOSIS — L40.50 PSORIATIC ARTHRITIS: Primary | ICD-10-CM

## 2022-09-15 DIAGNOSIS — M79.7 FIBROMYALGIA: ICD-10-CM

## 2022-09-15 PROCEDURE — 3074F PR MOST RECENT SYSTOLIC BLOOD PRESSURE < 130 MM HG: ICD-10-PCS | Mod: CPTII,,, | Performed by: INTERNAL MEDICINE

## 2022-09-15 PROCEDURE — 3074F SYST BP LT 130 MM HG: CPT | Mod: CPTII,,, | Performed by: INTERNAL MEDICINE

## 2022-09-15 PROCEDURE — 3044F PR MOST RECENT HEMOGLOBIN A1C LEVEL <7.0%: ICD-10-PCS | Mod: CPTII,,, | Performed by: INTERNAL MEDICINE

## 2022-09-15 PROCEDURE — 99214 PR OFFICE/OUTPT VISIT, EST, LEVL IV, 30-39 MIN: ICD-10-PCS | Mod: S$PBB,,, | Performed by: INTERNAL MEDICINE

## 2022-09-15 PROCEDURE — 3008F PR BODY MASS INDEX (BMI) DOCUMENTED: ICD-10-PCS | Mod: CPTII,,, | Performed by: INTERNAL MEDICINE

## 2022-09-15 PROCEDURE — 99214 OFFICE O/P EST MOD 30 MIN: CPT | Mod: S$PBB,,, | Performed by: INTERNAL MEDICINE

## 2022-09-15 PROCEDURE — 1159F MED LIST DOCD IN RCRD: CPT | Mod: CPTII,,, | Performed by: INTERNAL MEDICINE

## 2022-09-15 PROCEDURE — 3078F PR MOST RECENT DIASTOLIC BLOOD PRESSURE < 80 MM HG: ICD-10-PCS | Mod: CPTII,,, | Performed by: INTERNAL MEDICINE

## 2022-09-15 PROCEDURE — 99214 OFFICE O/P EST MOD 30 MIN: CPT | Mod: PBBFAC,PO | Performed by: INTERNAL MEDICINE

## 2022-09-15 PROCEDURE — 1159F PR MEDICATION LIST DOCUMENTED IN MEDICAL RECORD: ICD-10-PCS | Mod: CPTII,,, | Performed by: INTERNAL MEDICINE

## 2022-09-15 PROCEDURE — 3078F DIAST BP <80 MM HG: CPT | Mod: CPTII,,, | Performed by: INTERNAL MEDICINE

## 2022-09-15 PROCEDURE — 3008F BODY MASS INDEX DOCD: CPT | Mod: CPTII,,, | Performed by: INTERNAL MEDICINE

## 2022-09-15 PROCEDURE — 3044F HG A1C LEVEL LT 7.0%: CPT | Mod: CPTII,,, | Performed by: INTERNAL MEDICINE

## 2022-09-15 PROCEDURE — 99999 PR PBB SHADOW E&M-EST. PATIENT-LVL IV: ICD-10-PCS | Mod: PBBFAC,,, | Performed by: INTERNAL MEDICINE

## 2022-09-15 PROCEDURE — 99999 PR PBB SHADOW E&M-EST. PATIENT-LVL IV: CPT | Mod: PBBFAC,,, | Performed by: INTERNAL MEDICINE

## 2022-09-15 RX ORDER — METHOTREXATE 25 MG/ML
25 INJECTION, SOLUTION INTRA-ARTERIAL; INTRAMUSCULAR; INTRAVENOUS
Qty: 4 ML | Refills: 6 | Status: SHIPPED | OUTPATIENT
Start: 2022-09-15 | End: 2023-04-05 | Stop reason: SDUPTHER

## 2022-09-15 RX ORDER — HYDROXYZINE HYDROCHLORIDE 50 MG/1
TABLET, FILM COATED ORAL
COMMUNITY
End: 2023-12-08

## 2022-09-15 RX ORDER — FOLIC ACID 1 MG/1
2 TABLET ORAL DAILY
Qty: 60 TABLET | Refills: 4 | Status: SHIPPED | OUTPATIENT
Start: 2022-09-15 | End: 2023-04-05 | Stop reason: SDUPTHER

## 2022-09-15 RX ORDER — ERGOCALCIFEROL 1.25 MG/1
50000 CAPSULE ORAL
Qty: 12 CAPSULE | Refills: 0 | Status: SHIPPED | OUTPATIENT
Start: 2022-09-15 | End: 2023-01-19 | Stop reason: SDUPTHER

## 2022-09-15 RX ORDER — CELECOXIB 200 MG/1
200 CAPSULE ORAL 2 TIMES DAILY
Qty: 60 CAPSULE | Refills: 6 | Status: SHIPPED | OUTPATIENT
Start: 2022-09-15 | End: 2023-12-08

## 2022-09-15 NOTE — PROGRESS NOTES
42           RHEUMATOLOGY OUTPATIENT CLINIC NOTE    9/15/2022    Attending Rheumatologist: Jerrod Fernandez  Primary Care Provider: Jeremiah Varner MD   Physician Requesting Consultation: No referring provider defined for this encounter.  Chief Complaint/Reason For Consultation:  No chief complaint on file.      Subjective:       HPI  Sami Flowers is a 47 y.o. White female with medical history noted below who presents for evaluation of PsA.   Patient presents for evaluation of PsA. Patient diagnosed approximately 15 years ago. Prior therapies: MTX, Otezla and Humira. She was lost to follow up, and had insurance switch and has not seen a Rheumatologist in 3 years. Worse joints: Neck, Low back, hips, knees. +Swelling, Morning stiffness lasting hours. Pain improves with meds (Lyrica-stopped when she switched Pain docs), steroids injections. Pain worsens with prolonged standing or activity. +Rash, dactylitis, ankle swelling (no specific achilles enthesitis), Hx of Eye inflammation, constipation, HA, poor sleep, brain fog/forgeful, paraesthesias, myalgias, +FHX of PsO. No IBD, Randa.    Today  Patient here for follow up.   Last visit started on MTX for PsA. She notes improvement from about 8 to 6 on a pain scale level. She notes her main issue now besides pain is itchiness in her extremities. Still notes hair thinning, tolerating meds.     Review of Systems   Constitutional:  Positive for fatigue. Negative for chills, fever and unexpected weight change.   HENT:  Negative for mouth sores and trouble swallowing.    Eyes:  Positive for redness. Negative for eye dryness.   Respiratory:  Negative for cough and shortness of breath.    Cardiovascular:  Negative for chest pain.   Gastrointestinal:  Positive for constipation. Negative for abdominal distention, diarrhea, nausea and vomiting.   Genitourinary:  Negative for dysuria, genital sores and vaginal dryness.   Musculoskeletal:  Positive for arthralgias, back  pain, joint swelling, leg pain, myalgias, neck pain and neck stiffness. Negative for gait problem and joint deformity.   Integumentary:  Negative for rash.   Neurological:  Positive for numbness and headaches. Negative for weakness.   Hematological:  Negative for adenopathy. Bruises/bleeds easily.   Psychiatric/Behavioral:  Positive for decreased concentration and sleep disturbance. Negative for confusion. The patient is nervous/anxious.    All other systems reviewed and are negative.     Chronic comorbid conditions affecting medical decision making today:  No past medical history on file.  Past Surgical History:   Procedure Laterality Date     SECTION      HYSTERECTOMY      TONSILLECTOMY      TUMOR REMOVAL      fibroid     No family history on file.  Social History     Substance and Sexual Activity   Alcohol Use None     Social History     Tobacco Use   Smoking Status Never   Smokeless Tobacco Not on file     Social History     Substance and Sexual Activity   Drug Use Not on file       Current Outpatient Medications:     buPROPion (WELLBUTRIN XL) 150 MG TB24 tablet, Take 150 mg by mouth every morning., Disp: , Rfl:     celecoxib (CELEBREX) 200 MG capsule, Take 1 capsule (200 mg total) by mouth 2 (two) times daily., Disp: 60 capsule, Rfl: 6    cyclobenzaprine (FLEXERIL) 10 MG tablet, Take 1 tablet (10 mg total) by mouth 3 (three) times daily as needed for Muscle spasms (chronic pain)., Disp: 30 tablet, Rfl: 3    ergocalciferol (ERGOCALCIFEROL) 50,000 unit Cap, Take 1 capsule (50,000 Units total) by mouth every 7 days., Disp: 12 capsule, Rfl: 0    FLUoxetine 10 MG capsule, Take 10 mg by mouth once daily., Disp: , Rfl:     FLUoxetine 20 MG capsule, Take 20 mg by mouth once daily., Disp: , Rfl:     folic acid (FOLVITE) 1 MG tablet, Take 2 tablets (2 mg total) by mouth once daily., Disp: 60 tablet, Rfl: 4    hydrOXYzine (ATARAX) 50 MG tablet, hydroxyzine HCl 50 mg tablet  TAKE ONE TABLET BY MOUTH THREE TIMES  DAILY, Disp: , Rfl:     levothyroxine (SYNTHROID, LEVOTHROID) 175 MCG tablet, Take 1 tablet (175 mcg total) by mouth before breakfast. thyroid, Disp: 70 tablet, Rfl: 3    methotrexate 25 mg/mL injection, Inject 1 mL (25 mg total) into the skin every 7 days., Disp: 4 mL, Rfl: 6    naproxen (NAPROSYN) 500 MG tablet, Take 1 tablet (500 mg total) by mouth 2 (two) times daily as needed (headaches (take with meals, do not take with ibuprofen))., Disp: 40 tablet, Rfl: 1    ondansetron (ZOFRAN-ODT) 8 MG TbDL, Take 1 tablet (8 mg total) by mouth every 6 (six) hours as needed (nausea/vomiting)., Disp: 30 tablet, Rfl: 2    pulse oximeter (PULSE OXIMETER) device, by Apply Externally route 2 (two) times a day. Use twice daily at 8 AM and 3 PM and [facility]., Disp: 1 each, Rfl: 0    SUBOXONE 8-2 mg, SMARTSI.5 Strip(s) Sublingual 3 Times Daily, Disp: , Rfl:     sumatriptan (IMITREX) 100 MG tablet, 100 mg as a single dose. If symptoms persist or return, may repeat dose after =2 hours. Maximum dose: 100 mg/dose; 200 mg per 24 hours, Disp: 20 tablet, Rfl: 2     Objective:         Vitals:    09/15/22 1436   BP: 107/62   Pulse: 88   Resp: 18     Physical Exam   Musculoskeletal:      Comments: Can make fist, no synovitis though multiple MCP/PIP with TTP  Knee Crepitus   Hip and Lumbar ROM oK  Ankle with TTP  Negative MTP Squeeze test   Tender Points:  No   Yes  [ ]    [x ]   Low cervical anterior aspect    [ ]    [x ]   Costochondral Junction   [ ]    [x ]   Lateral Epicondyle  [ ]    [x ]   Suboccipital   [ ]    [x ]   Trapezius   [ ]    [x ]   Supraspinatus   [ ]    [x ]   Gluteal   [ ]    [x ]   Greater trochanter  [ ]    [x ]   Knee       Skin: Rash noted.   Skin excoriations on feet     Reviewed old and all outside pertinent medical records available.    All lab results personally reviewed and interpreted by me.  Lab Results   Component Value Date    WBC 8.56 2022    HGB 12.6 2022    HCT  38.5 08/04/2022    MCV 94 08/04/2022    MCH 30.7 08/04/2022    MCHC 32.7 08/04/2022    RDW 17.7 (H) 08/04/2022     08/04/2022    MPV 9.7 08/04/2022       Lab Results   Component Value Date     08/04/2022    K 3.7 08/04/2022     08/04/2022    CO2 31 (H) 08/04/2022     08/04/2022    BUN 9 08/04/2022    CALCIUM 9.4 08/04/2022    PROT 8.0 08/04/2022    ALBUMIN 3.4 (L) 08/04/2022    BILITOT 0.4 08/04/2022    AST 20 08/04/2022    ALKPHOS 138 (H) 08/04/2022    ALT 11 08/04/2022       No results found for: COLORU, APPEARANCEUA, SPECGRAV, PHUR, PHUA, PROTEINUA, GLUCOSEU, KETONESU, BLOODU, LEUKOCYTESUR, NITRITE, UROBILINOGEN    Lab Results   Component Value Date    CRP 37.6 (H) 08/04/2022       Lab Results   Component Value Date    SEDRATE 44 (H) 08/04/2022       Lab Results   Component Value Date    RF <13.0 08/04/2022    SEDRATE 44 (H) 08/04/2022       No components found for: 25OHVITDTOT, 33UUXXCB6, 79WRYLKI2, METHODNOTE    Lab Results   Component Value Date    URICACID 7.0 (H) 08/04/2022       No components found for: TSPOTTB        Imaging:  All imaging reviewed and independently interpreted by me.         ASSESSMENT / PLAN:     Sami Flowers is a 47 y.o. White female with:      1. Psoriatic arthritis  - patient with Hx of PsA  - improving  - continue MTX 25mg subq weekly  - will refer to DERM for itchiness   - recent labs reviewed   - reassurance     2. Primary osteoarthritis involving multiple joints  - stable   - wt loss  - Tylenol PRN   - reassurance and exercise     3. Fibromyalgia  - in addition to above she has FM which will make her pain goals harder to achieve  - she notes in the past benefit from Lyrica, but at this time is under the care of Pain who has her on Suboxone, advised to discuss with them  - sleep hygiene and stress management reinforced  - reassurance and exercise     4. Encounter for long-term (current) use of other medications  - discussed med SE  - increased  FA   - monitor Labs     5. Other specified counseling  - over 10 minutes spent regarding below topics:  - Immunization counseling done.  - Weight loss counseling done.  - Nutrition and exercise counseling.  - Limitation of alcohol consumption.  - Regular exercise:  Aerobic and resistance.  - Medication counseling provided.    6. Morbid Obesity  - would benefit from decreasing at least 10% of body weight.  - recommended goal of losing 1 lb per week.  - consider nutritionist evaluation.  - would consider screening for DARINEL per PMD.    Follow up in about 3 months (around 12/15/2022).    Method of contact with patient concerns: [facility]    Disclaimer:  This note is prepared using voice recognition software and as such is likely to have errors and has not been proof read. Please contact me for questions.     Time spent: 30 minutes in face to face discussion concerning diagnosis, prognosis, review of lab and test results, benefits of treatment as well as management of disease, counseling of patient and coordination of care between various health care providers.  Greater than half the time spent was used for coordination of care and counseling of patient.    Jerrod Fernandez M.D.  Rheumatology Department   [facility]

## 2022-09-20 ENCOUNTER — TELEPHONE (OUTPATIENT)
Dept: RHEUMATOLOGY | Facility: CLINIC | Age: 47
End: 2022-09-20
Payer: MEDICAID

## 2022-10-24 ENCOUNTER — PATIENT MESSAGE (OUTPATIENT)
Dept: RHEUMATOLOGY | Facility: CLINIC | Age: 47
End: 2022-10-24
Payer: MEDICAID

## 2022-10-24 DIAGNOSIS — L40.50 PSA (PSORIATIC ARTHRITIS): Primary | ICD-10-CM

## 2022-12-21 ENCOUNTER — PATIENT MESSAGE (OUTPATIENT)
Dept: RHEUMATOLOGY | Facility: CLINIC | Age: 47
End: 2022-12-21
Payer: MEDICAID

## 2023-01-18 ENCOUNTER — PATIENT MESSAGE (OUTPATIENT)
Dept: ADMINISTRATIVE | Facility: HOSPITAL | Age: 48
End: 2023-01-18
Payer: MEDICAID

## 2023-01-19 DIAGNOSIS — L40.50 PSORIATIC ARTHRITIS: ICD-10-CM

## 2023-01-19 RX ORDER — ERGOCALCIFEROL 1.25 MG/1
50000 CAPSULE ORAL
Qty: 12 CAPSULE | Refills: 0 | Status: SHIPPED | OUTPATIENT
Start: 2023-01-19 | End: 2023-02-21 | Stop reason: SDUPTHER

## 2023-02-21 ENCOUNTER — PATIENT MESSAGE (OUTPATIENT)
Dept: RHEUMATOLOGY | Facility: CLINIC | Age: 48
End: 2023-02-21
Payer: MEDICAID

## 2023-03-08 ENCOUNTER — PATIENT MESSAGE (OUTPATIENT)
Dept: RHEUMATOLOGY | Facility: CLINIC | Age: 48
End: 2023-03-08
Payer: MEDICAID

## 2023-04-05 ENCOUNTER — LAB VISIT (OUTPATIENT)
Dept: LAB | Facility: HOSPITAL | Age: 48
End: 2023-04-05
Attending: INTERNAL MEDICINE
Payer: MEDICAID

## 2023-04-05 ENCOUNTER — OFFICE VISIT (OUTPATIENT)
Dept: RHEUMATOLOGY | Facility: CLINIC | Age: 48
End: 2023-04-05
Payer: MEDICAID

## 2023-04-05 VITALS
HEIGHT: 65 IN | HEART RATE: 77 BPM | WEIGHT: 197.5 LBS | SYSTOLIC BLOOD PRESSURE: 129 MMHG | BODY MASS INDEX: 32.9 KG/M2 | OXYGEN SATURATION: 99 % | DIASTOLIC BLOOD PRESSURE: 73 MMHG

## 2023-04-05 DIAGNOSIS — Z71.89 COUNSELING AND COORDINATION OF CARE: ICD-10-CM

## 2023-04-05 DIAGNOSIS — L40.50 PSA (PSORIATIC ARTHRITIS): Primary | ICD-10-CM

## 2023-04-05 DIAGNOSIS — Z79.899 IMMUNODEFICIENCY DUE TO TREATMENT WITH IMMUNOSUPPRESSIVE MEDICATION: ICD-10-CM

## 2023-04-05 DIAGNOSIS — E66.9 CLASS 1 OBESITY WITH BODY MASS INDEX (BMI) OF 32.0 TO 32.9 IN ADULT, UNSPECIFIED OBESITY TYPE, UNSPECIFIED WHETHER SERIOUS COMORBIDITY PRESENT: ICD-10-CM

## 2023-04-05 DIAGNOSIS — M79.7 FIBROMYALGIA: ICD-10-CM

## 2023-04-05 DIAGNOSIS — L40.50 PSORIATIC ARTHRITIS: ICD-10-CM

## 2023-04-05 DIAGNOSIS — M15.9 PRIMARY OSTEOARTHRITIS INVOLVING MULTIPLE JOINTS: ICD-10-CM

## 2023-04-05 DIAGNOSIS — D84.821 IMMUNODEFICIENCY DUE TO TREATMENT WITH IMMUNOSUPPRESSIVE MEDICATION: ICD-10-CM

## 2023-04-05 DIAGNOSIS — L40.50 PSA (PSORIATIC ARTHRITIS): ICD-10-CM

## 2023-04-05 LAB
25(OH)D3+25(OH)D2 SERPL-MCNC: 12 NG/ML (ref 30–96)
ALBUMIN SERPL BCP-MCNC: 3.3 G/DL (ref 3.5–5.2)
ALP SERPL-CCNC: 141 U/L (ref 55–135)
ALT SERPL W/O P-5'-P-CCNC: 7 U/L (ref 10–44)
ANION GAP SERPL CALC-SCNC: 11 MMOL/L (ref 8–16)
AST SERPL-CCNC: 8 U/L (ref 10–40)
BASOPHILS # BLD AUTO: 0.02 K/UL (ref 0–0.2)
BASOPHILS NFR BLD: 0.1 % (ref 0–1.9)
BILIRUB SERPL-MCNC: 0.2 MG/DL (ref 0.1–1)
BUN SERPL-MCNC: 11 MG/DL (ref 6–20)
CALCIUM SERPL-MCNC: 9.6 MG/DL (ref 8.7–10.5)
CHLORIDE SERPL-SCNC: 106 MMOL/L (ref 95–110)
CO2 SERPL-SCNC: 20 MMOL/L (ref 23–29)
CREAT SERPL-MCNC: 0.7 MG/DL (ref 0.5–1.4)
CRP SERPL-MCNC: 10.5 MG/L (ref 0–8.2)
DIFFERENTIAL METHOD: ABNORMAL
EOSINOPHIL # BLD AUTO: 0 K/UL (ref 0–0.5)
EOSINOPHIL NFR BLD: 0 % (ref 0–8)
ERYTHROCYTE [DISTWIDTH] IN BLOOD BY AUTOMATED COUNT: 15.9 % (ref 11.5–14.5)
ERYTHROCYTE [SEDIMENTATION RATE] IN BLOOD BY PHOTOMETRIC METHOD: 60 MM/HR (ref 0–36)
EST. GFR  (NO RACE VARIABLE): >60 ML/MIN/1.73 M^2
GLUCOSE SERPL-MCNC: 167 MG/DL (ref 70–110)
HCT VFR BLD AUTO: 44.3 % (ref 37–48.5)
HGB BLD-MCNC: 14 G/DL (ref 12–16)
IMM GRANULOCYTES # BLD AUTO: 0.11 K/UL (ref 0–0.04)
IMM GRANULOCYTES NFR BLD AUTO: 0.7 % (ref 0–0.5)
LYMPHOCYTES # BLD AUTO: 1.2 K/UL (ref 1–4.8)
LYMPHOCYTES NFR BLD: 7.2 % (ref 18–48)
MCH RBC QN AUTO: 28.6 PG (ref 27–31)
MCHC RBC AUTO-ENTMCNC: 31.6 G/DL (ref 32–36)
MCV RBC AUTO: 90 FL (ref 82–98)
MONOCYTES # BLD AUTO: 0.4 K/UL (ref 0.3–1)
MONOCYTES NFR BLD: 2.3 % (ref 4–15)
NEUTROPHILS # BLD AUTO: 14.4 K/UL (ref 1.8–7.7)
NEUTROPHILS NFR BLD: 89.7 % (ref 38–73)
NRBC BLD-RTO: 0 /100 WBC
PLATELET # BLD AUTO: 459 K/UL (ref 150–450)
PMV BLD AUTO: 10.6 FL (ref 9.2–12.9)
POTASSIUM SERPL-SCNC: 4.5 MMOL/L (ref 3.5–5.1)
PROT SERPL-MCNC: 8.3 G/DL (ref 6–8.4)
RBC # BLD AUTO: 4.9 M/UL (ref 4–5.4)
SODIUM SERPL-SCNC: 137 MMOL/L (ref 136–145)
TSH SERPL DL<=0.005 MIU/L-ACNC: 1.24 UIU/ML (ref 0.4–4)
URATE SERPL-MCNC: 3.6 MG/DL (ref 2.4–5.7)
WBC # BLD AUTO: 16.03 K/UL (ref 3.9–12.7)

## 2023-04-05 PROCEDURE — 82306 VITAMIN D 25 HYDROXY: CPT | Performed by: INTERNAL MEDICINE

## 2023-04-05 PROCEDURE — 99214 OFFICE O/P EST MOD 30 MIN: CPT | Mod: PBBFAC,PO | Performed by: INTERNAL MEDICINE

## 2023-04-05 PROCEDURE — 84443 ASSAY THYROID STIM HORMONE: CPT | Performed by: INTERNAL MEDICINE

## 2023-04-05 PROCEDURE — 1159F PR MEDICATION LIST DOCUMENTED IN MEDICAL RECORD: ICD-10-PCS | Mod: CPTII,,, | Performed by: INTERNAL MEDICINE

## 2023-04-05 PROCEDURE — 99214 PR OFFICE/OUTPT VISIT, EST, LEVL IV, 30-39 MIN: ICD-10-PCS | Mod: S$PBB,,, | Performed by: INTERNAL MEDICINE

## 2023-04-05 PROCEDURE — 85025 COMPLETE CBC W/AUTO DIFF WBC: CPT | Performed by: INTERNAL MEDICINE

## 2023-04-05 PROCEDURE — 99214 OFFICE O/P EST MOD 30 MIN: CPT | Mod: S$PBB,,, | Performed by: INTERNAL MEDICINE

## 2023-04-05 PROCEDURE — 3074F PR MOST RECENT SYSTOLIC BLOOD PRESSURE < 130 MM HG: ICD-10-PCS | Mod: CPTII,,, | Performed by: INTERNAL MEDICINE

## 2023-04-05 PROCEDURE — 99999 PR PBB SHADOW E&M-EST. PATIENT-LVL IV: CPT | Mod: PBBFAC,,, | Performed by: INTERNAL MEDICINE

## 2023-04-05 PROCEDURE — 3078F DIAST BP <80 MM HG: CPT | Mod: CPTII,,, | Performed by: INTERNAL MEDICINE

## 2023-04-05 PROCEDURE — 3078F PR MOST RECENT DIASTOLIC BLOOD PRESSURE < 80 MM HG: ICD-10-PCS | Mod: CPTII,,, | Performed by: INTERNAL MEDICINE

## 2023-04-05 PROCEDURE — 3008F PR BODY MASS INDEX (BMI) DOCUMENTED: ICD-10-PCS | Mod: CPTII,,, | Performed by: INTERNAL MEDICINE

## 2023-04-05 PROCEDURE — 84550 ASSAY OF BLOOD/URIC ACID: CPT | Performed by: INTERNAL MEDICINE

## 2023-04-05 PROCEDURE — 3008F BODY MASS INDEX DOCD: CPT | Mod: CPTII,,, | Performed by: INTERNAL MEDICINE

## 2023-04-05 PROCEDURE — 86140 C-REACTIVE PROTEIN: CPT | Performed by: INTERNAL MEDICINE

## 2023-04-05 PROCEDURE — 99999 PR PBB SHADOW E&M-EST. PATIENT-LVL IV: ICD-10-PCS | Mod: PBBFAC,,, | Performed by: INTERNAL MEDICINE

## 2023-04-05 PROCEDURE — 1159F MED LIST DOCD IN RCRD: CPT | Mod: CPTII,,, | Performed by: INTERNAL MEDICINE

## 2023-04-05 PROCEDURE — 3074F SYST BP LT 130 MM HG: CPT | Mod: CPTII,,, | Performed by: INTERNAL MEDICINE

## 2023-04-05 PROCEDURE — 80053 COMPREHEN METABOLIC PANEL: CPT | Performed by: INTERNAL MEDICINE

## 2023-04-05 PROCEDURE — 85652 RBC SED RATE AUTOMATED: CPT | Performed by: INTERNAL MEDICINE

## 2023-04-05 PROCEDURE — 36415 COLL VENOUS BLD VENIPUNCTURE: CPT | Mod: PO | Performed by: INTERNAL MEDICINE

## 2023-04-05 RX ORDER — HYDROXYZINE PAMOATE 50 MG/1
100 CAPSULE ORAL 2 TIMES DAILY PRN
COMMUNITY
Start: 2023-03-13

## 2023-04-05 RX ORDER — VENLAFAXINE HYDROCHLORIDE 150 MG/1
150 CAPSULE, EXTENDED RELEASE ORAL
COMMUNITY
Start: 2023-01-26 | End: 2023-12-08

## 2023-04-05 RX ORDER — DULOXETIN HYDROCHLORIDE 60 MG/1
CAPSULE, DELAYED RELEASE ORAL
COMMUNITY
End: 2023-08-02

## 2023-04-05 RX ORDER — MIRTAZAPINE 15 MG/1
15 TABLET, FILM COATED ORAL NIGHTLY
COMMUNITY
Start: 2023-01-26

## 2023-04-05 RX ORDER — FOLIC ACID 1 MG/1
2 TABLET ORAL DAILY
Qty: 60 TABLET | Refills: 4 | Status: SHIPPED | OUTPATIENT
Start: 2023-04-05 | End: 2024-04-04

## 2023-04-05 RX ORDER — METHOTREXATE 25 MG/ML
25 INJECTION, SOLUTION INTRA-ARTERIAL; INTRAMUSCULAR; INTRAVENOUS
Qty: 4 ML | Refills: 6 | Status: SHIPPED | OUTPATIENT
Start: 2023-04-05 | End: 2023-08-02 | Stop reason: SDUPTHER

## 2023-04-05 RX ORDER — CLONIDINE 0.2 MG/24H
PATCH, EXTENDED RELEASE TRANSDERMAL
COMMUNITY
Start: 2022-09-07 | End: 2023-12-08

## 2023-04-05 NOTE — PROGRESS NOTES
42           RHEUMATOLOGY OUTPATIENT CLINIC NOTE    4/5/2023    Attending Rheumatologist: Jerrod Fernandez  Primary Care Provider: Jeremiah Varner MD   Physician Requesting Consultation: No referring provider defined for this encounter.  Chief Complaint/Reason For Consultation:  Joint Pain and Swelling (stiffness)      Subjective:       HPI  Sami Flowers is a 47 y.o. White female with medical history noted below who presents for evaluation of PsA.   Patient presents for evaluation of PsA. Patient diagnosed approximately 15 years ago. Prior therapies: MTX, Otezla and Humira. She was lost to follow up, and had insurance switch and has not seen a Rheumatologist in 3 years. Worse joints: Neck, Low back, hips, knees. +Swelling, Morning stiffness lasting hours. Pain improves with meds (Lyrica-stopped when she switched Pain docs), steroids injections. Pain worsens with prolonged standing or activity. +Rash, dactylitis, ankle swelling (no specific achilles enthesitis), Hx of Eye inflammation, constipation, HA, poor sleep, brain fog/forgeful, paraesthesias, myalgias, +FHX of PsO. No IBD, Randa.    Today  Patient here for follow up.   Last visit MTX continued for PsA. She notes her PsO and joints have been doing well. Saw DERM for itchiness, unclear etiology advised to use benadryl. Interestingly her brother has similar complaints. Notes daughter recently started having PsO. Otherwise stable. Tolerating meds.       Review of Systems   Constitutional:  Negative for chills, fatigue, fever and unexpected weight change.   HENT:  Negative for mouth sores and trouble swallowing.    Eyes:  Negative for redness and eye dryness.   Respiratory:  Negative for cough and shortness of breath.    Cardiovascular:  Negative for chest pain.   Gastrointestinal:  Negative for abdominal distention, constipation, diarrhea, nausea and vomiting.   Genitourinary:  Negative for dysuria, genital sores and vaginal dryness.   Musculoskeletal:   Positive for arthralgias and back pain. Negative for gait problem, joint swelling, leg pain, myalgias, neck pain, neck stiffness and joint deformity.   Integumentary:  Negative for rash.   Neurological:  Negative for weakness, numbness and headaches.   Hematological:  Negative for adenopathy. Does not bruise/bleed easily.   Psychiatric/Behavioral:  Negative for confusion, decreased concentration and sleep disturbance. The patient is not nervous/anxious.    All other systems reviewed and are negative.     Chronic comorbid conditions affecting medical decision making today:  History reviewed. No pertinent past medical history.  Past Surgical History:   Procedure Laterality Date     SECTION      HYSTERECTOMY      TONSILLECTOMY      TUMOR REMOVAL      fibroid     History reviewed. No pertinent family history.  Social History     Substance and Sexual Activity   Alcohol Use None     Social History     Tobacco Use   Smoking Status Never   Smokeless Tobacco Not on file     Social History     Substance and Sexual Activity   Drug Use Not on file       Current Outpatient Medications:     celecoxib (CELEBREX) 200 MG capsule, Take 1 capsule (200 mg total) by mouth 2 (two) times daily., Disp: 60 capsule, Rfl: 6    cloNIDine 0.2 mg/24 hr td ptwk (CATAPRES) 0.2 mg/24 hr, , Disp: , Rfl:     cyclobenzaprine (FLEXERIL) 10 MG tablet, Take 1 tablet (10 mg total) by mouth 3 (three) times daily as needed for Muscle spasms (chronic pain)., Disp: 30 tablet, Rfl: 3    ergocalciferol (ERGOCALCIFEROL) 50,000 unit Cap, Take 1 capsule (50,000 Units total) by mouth every 7 days., Disp: 12 capsule, Rfl: 0    hydrOXYzine (ATARAX) 50 MG tablet, hydroxyzine HCl 50 mg tablet  TAKE ONE TABLET BY MOUTH THREE TIMES DAILY, Disp: , Rfl:     levothyroxine (SYNTHROID, LEVOTHROID) 175 MCG tablet, Take 1 tablet (175 mcg total) by mouth before breakfast. thyroid, Disp: 70 tablet, Rfl: 3    naproxen (NAPROSYN) 500 MG tablet, Take 1 tablet (500 mg total)  by mouth 2 (two) times daily as needed (headaches (take with meals, do not take with ibuprofen))., Disp: 40 tablet, Rfl: 1    ondansetron (ZOFRAN-ODT) 8 MG TbDL, Take 1 tablet (8 mg total) by mouth every 6 (six) hours as needed (nausea/vomiting)., Disp: 30 tablet, Rfl: 2    pulse oximeter (PULSE OXIMETER) device, by Apply Externally route 2 (two) times a day. Use twice daily at 8 AM and 3 PM and record the value in MyChart as directed., Disp: 1 each, Rfl: 0    SUBOXONE 8-2 mg, SMARTSI.5 Strip(s) Sublingual 3 Times Daily, Disp: , Rfl:     sumatriptan (IMITREX) 100 MG tablet, TAKE 1 TABLET AS SINGLE DOSE, MAY REPEAT 1 DOSE IN 2 HRS IF SYMPTOMS PERSIST/RETURN, MAX DOSE IS 100MG/DOSE, 200MG PER 24 HRS, Disp: 18 tablet, Rfl: 2    buPROPion (WELLBUTRIN XL) 150 MG TB24 tablet, Take 150 mg by mouth every morning., Disp: , Rfl:     DULoxetine (CYMBALTA) 60 MG capsule, duloxetine 60 mg capsule,delayed release  TAKE ONE CAPSULE BY MOUTH EVERY MORNING (REPLACES FLUOXETINE), Disp: , Rfl:     FLUoxetine 10 MG capsule, Take 10 mg by mouth once daily., Disp: , Rfl:     FLUoxetine 20 MG capsule, Take 20 mg by mouth once daily., Disp: , Rfl:     folic acid (FOLVITE) 1 MG tablet, Take 2 tablets (2 mg total) by mouth once daily., Disp: 60 tablet, Rfl: 4    hydrOXYzine pamoate (VISTARIL) 50 MG Cap, Take 100 mg by mouth 2 (two) times daily as needed., Disp: , Rfl:     methotrexate 25 mg/mL injection, Inject 1 mL (25 mg total) into the skin every 7 days., Disp: 4 mL, Rfl: 6    mirtazapine (REMERON) 15 MG tablet, Take 15 mg by mouth every evening., Disp: , Rfl:     venlafaxine (EFFEXOR-XR) 150 MG Cp24, Take 150 mg by mouth., Disp: , Rfl:      Objective:         Vitals:    23 1131   BP: 129/73   Pulse: 77     Physical Exam   Musculoskeletal:      Comments: Can make fist, no synovitis though multiple MCP/PIP with TTP  Knee Crepitus   Hip and Lumbar ROM oK  Ankle with TTP  Negative MTP Squeeze test   Tender Points:  No   Yes  [ ]     [x ]   Low cervical anterior aspect    [ ]    [x ]   Costochondral Junction   [ ]    [x ]   Lateral Epicondyle  [ ]    [x ]   Suboccipital   [ ]    [x ]   Trapezius   [ ]    [x ]   Supraspinatus   [ ]    [x ]   Gluteal   [ ]    [x ]   Greater trochanter  [ ]    [x ]   Knee       Skin: Rash noted.   Skin excoriations on feet     Reviewed old and all outside pertinent medical records available.    All lab results personally reviewed and interpreted by me.  Lab Results   Component Value Date    WBC 8.56 08/04/2022    HGB 12.6 08/04/2022    HCT 38.5 08/04/2022    MCV 94 08/04/2022    MCH 30.7 08/04/2022    MCHC 32.7 08/04/2022    RDW 17.7 (H) 08/04/2022     08/04/2022    MPV 9.7 08/04/2022       Lab Results   Component Value Date     08/04/2022    K 3.7 08/04/2022     08/04/2022    CO2 31 (H) 08/04/2022     08/04/2022    BUN 9 08/04/2022    CALCIUM 9.4 08/04/2022    PROT 8.0 08/04/2022    ALBUMIN 3.4 (L) 08/04/2022    BILITOT 0.4 08/04/2022    AST 20 08/04/2022    ALKPHOS 138 (H) 08/04/2022    ALT 11 08/04/2022       No results found for: COLORU, APPEARANCEUA, SPECGRAV, PHUR, PHUA, PROTEINUA, GLUCOSEU, KETONESU, BLOODU, LEUKOCYTESUR, NITRITE, UROBILINOGEN    Lab Results   Component Value Date    CRP 37.6 (H) 08/04/2022       Lab Results   Component Value Date    SEDRATE 44 (H) 08/04/2022       Lab Results   Component Value Date    RF <13.0 08/04/2022    SEDRATE 44 (H) 08/04/2022       No components found for: 25OHVITDTOT, 69MDSFXS5, 46WJOUAE5, METHODNOTE    Lab Results   Component Value Date    URICACID 7.0 (H) 08/04/2022       No components found for: TSPOTTB        Imaging:  All imaging reviewed and independently interpreted by me.         ASSESSMENT / PLAN:     Sami Flowers is a 47 y.o. White female with:      1. Psoriatic arthritis  - patient with Hx of PsA  - doing well  - continue MTX 25mg subq weekly  - update labs   - reassurance     2. Primary osteoarthritis involving  multiple joints  - chronic   - wt loss  - Tylenol PRN   - reassurance and exercise     3. Fibromyalgia  - in addition to above she has FM which will make her pain goals harder to achieve  - she notes in the past benefit from Lyrica, but at this time is under the care of Pain who has her on Suboxone, advised to discuss with them  - sleep hygiene and stress management reinforced  - reassurance and exercise     4. Encounter for long-term (current) use of other medications  - Daily FA  - monitor labs  - vaccines per guidelines  - immunosuppression/infectious precautions discussed    5. Other specified counseling  - over 10 minutes spent regarding below topics:  - Immunization counseling done.  - Weight loss counseling done.  - Nutrition and exercise counseling.  - Limitation of alcohol consumption.  - Regular exercise:  Aerobic and resistance.  - Medication counseling provided.    6. Obesity  - would benefit from decreasing at least 10% of body weight.  - recommended goal of losing 1 lb per week.  - consider nutritionist evaluation.  - would consider screening for DARINEL per PMD.    Follow up in about 3 months (around 7/5/2023).    Method of contact with patient concerns: [facility]    Disclaimer:  This note is prepared using voice recognition software and as such is likely to have errors and has not been proof read. Please contact me for questions.     Time spent: 30 minutes in face to face discussion concerning diagnosis, prognosis, review of lab and test results, benefits of treatment as well as management of disease, counseling of patient and coordination of care between various health care providers.  Greater than half the time spent was used for coordination of care and counseling of patient.    Jerrod Fernandez M.D.  Rheumatology Department   [facility]

## 2023-04-06 ENCOUNTER — PATIENT MESSAGE (OUTPATIENT)
Dept: RHEUMATOLOGY | Facility: CLINIC | Age: 48
End: 2023-04-06
Payer: MEDICAID

## 2023-04-28 DIAGNOSIS — G43.809 OTHER MIGRAINE WITHOUT STATUS MIGRAINOSUS, NOT INTRACTABLE: ICD-10-CM

## 2023-05-08 ENCOUNTER — PATIENT MESSAGE (OUTPATIENT)
Dept: ADMINISTRATIVE | Facility: HOSPITAL | Age: 48
End: 2023-05-08
Payer: MEDICAID

## 2023-05-08 ENCOUNTER — PATIENT OUTREACH (OUTPATIENT)
Dept: ADMINISTRATIVE | Facility: HOSPITAL | Age: 48
End: 2023-05-08
Payer: MEDICAID

## 2023-05-08 DIAGNOSIS — Z12.31 ENCOUNTER FOR MAMMOGRAM TO ESTABLISH BASELINE MAMMOGRAM: Primary | ICD-10-CM

## 2023-05-23 RX ORDER — ONDANSETRON 4 MG/1
TABLET, ORALLY DISINTEGRATING ORAL
Qty: 30 TABLET | Refills: 1 | OUTPATIENT
Start: 2023-05-23

## 2023-05-23 RX ORDER — LEVOTHYROXINE SODIUM 175 UG/1
TABLET ORAL
Qty: 70 TABLET | Refills: 3 | Status: SHIPPED | OUTPATIENT
Start: 2023-05-23

## 2023-05-23 NOTE — TELEPHONE ENCOUNTER
Care Due:                  Date            Visit Type   Department     Provider  --------------------------------------------------------------------------------                                NP -                              PRIMARY      [facility] PRIMARY  Last Visit: 01-      CARE ([facility])   NANDO CAGLE  Next Visit: None Scheduled  None         None Found                                                            Last  Test          Frequency    Reason                     Performed    Due Date  --------------------------------------------------------------------------------    Office Visit  12 months..  levothyroxine............  01-   01-    [facility] Embedded Care Due Messages. Reference number: [id number].   5/23/2023 3:42:10 PM CDT

## 2023-08-02 ENCOUNTER — LAB VISIT (OUTPATIENT)
Dept: LAB | Facility: HOSPITAL | Age: 48
End: 2023-08-02
Attending: INTERNAL MEDICINE
Payer: MEDICAID

## 2023-08-02 ENCOUNTER — OFFICE VISIT (OUTPATIENT)
Dept: RHEUMATOLOGY | Facility: CLINIC | Age: 48
End: 2023-08-02
Payer: MEDICAID

## 2023-08-02 VITALS
HEIGHT: 65 IN | DIASTOLIC BLOOD PRESSURE: 82 MMHG | HEART RATE: 80 BPM | SYSTOLIC BLOOD PRESSURE: 128 MMHG | WEIGHT: 206 LBS | BODY MASS INDEX: 34.32 KG/M2

## 2023-08-02 DIAGNOSIS — L40.50 PSORIATIC ARTHRITIS: Primary | ICD-10-CM

## 2023-08-02 DIAGNOSIS — D84.821 IMMUNODEFICIENCY DUE TO TREATMENT WITH IMMUNOSUPPRESSIVE MEDICATION: ICD-10-CM

## 2023-08-02 DIAGNOSIS — E66.9 CLASS 1 OBESITY WITH BODY MASS INDEX (BMI) OF 34.0 TO 34.9 IN ADULT, UNSPECIFIED OBESITY TYPE, UNSPECIFIED WHETHER SERIOUS COMORBIDITY PRESENT: ICD-10-CM

## 2023-08-02 DIAGNOSIS — L40.50 PSORIATIC ARTHRITIS: ICD-10-CM

## 2023-08-02 DIAGNOSIS — Z79.899 IMMUNODEFICIENCY DUE TO TREATMENT WITH IMMUNOSUPPRESSIVE MEDICATION: ICD-10-CM

## 2023-08-02 DIAGNOSIS — M79.89 LEG SWELLING: ICD-10-CM

## 2023-08-02 DIAGNOSIS — Z71.89 COUNSELING AND COORDINATION OF CARE: ICD-10-CM

## 2023-08-02 DIAGNOSIS — M79.7 FIBROMYALGIA: ICD-10-CM

## 2023-08-02 DIAGNOSIS — M15.9 PRIMARY OSTEOARTHRITIS INVOLVING MULTIPLE JOINTS: ICD-10-CM

## 2023-08-02 LAB
ALBUMIN SERPL BCP-MCNC: 3.4 G/DL (ref 3.5–5.2)
ALP SERPL-CCNC: 150 U/L (ref 55–135)
ALT SERPL W/O P-5'-P-CCNC: 14 U/L (ref 10–44)
ANION GAP SERPL CALC-SCNC: 11 MMOL/L (ref 8–16)
AST SERPL-CCNC: 26 U/L (ref 10–40)
BASOPHILS # BLD AUTO: 0.06 K/UL (ref 0–0.2)
BASOPHILS NFR BLD: 0.6 % (ref 0–1.9)
BILIRUB SERPL-MCNC: 0.3 MG/DL (ref 0.1–1)
BUN SERPL-MCNC: 13 MG/DL (ref 6–20)
CALCIUM SERPL-MCNC: 9.4 MG/DL (ref 8.7–10.5)
CHLORIDE SERPL-SCNC: 104 MMOL/L (ref 95–110)
CO2 SERPL-SCNC: 24 MMOL/L (ref 23–29)
CREAT SERPL-MCNC: 0.7 MG/DL (ref 0.5–1.4)
DIFFERENTIAL METHOD: ABNORMAL
EOSINOPHIL # BLD AUTO: 0.2 K/UL (ref 0–0.5)
EOSINOPHIL NFR BLD: 2.1 % (ref 0–8)
ERYTHROCYTE [DISTWIDTH] IN BLOOD BY AUTOMATED COUNT: 18.3 % (ref 11.5–14.5)
EST. GFR  (NO RACE VARIABLE): >60 ML/MIN/1.73 M^2
GLUCOSE SERPL-MCNC: 74 MG/DL (ref 70–110)
HCT VFR BLD AUTO: 42.5 % (ref 37–48.5)
HGB BLD-MCNC: 13.6 G/DL (ref 12–16)
IMM GRANULOCYTES # BLD AUTO: 0.03 K/UL (ref 0–0.04)
IMM GRANULOCYTES NFR BLD AUTO: 0.3 % (ref 0–0.5)
LYMPHOCYTES # BLD AUTO: 2.9 K/UL (ref 1–4.8)
LYMPHOCYTES NFR BLD: 31 % (ref 18–48)
MCH RBC QN AUTO: 29.8 PG (ref 27–31)
MCHC RBC AUTO-ENTMCNC: 32 G/DL (ref 32–36)
MCV RBC AUTO: 93 FL (ref 82–98)
MONOCYTES # BLD AUTO: 0.5 K/UL (ref 0.3–1)
MONOCYTES NFR BLD: 5.4 % (ref 4–15)
NEUTROPHILS # BLD AUTO: 5.7 K/UL (ref 1.8–7.7)
NEUTROPHILS NFR BLD: 60.6 % (ref 38–73)
NRBC BLD-RTO: 0 /100 WBC
PLATELET # BLD AUTO: 365 K/UL (ref 150–450)
PMV BLD AUTO: 9.5 FL (ref 9.2–12.9)
POTASSIUM SERPL-SCNC: 4.5 MMOL/L (ref 3.5–5.1)
PROT SERPL-MCNC: 7.9 G/DL (ref 6–8.4)
RBC # BLD AUTO: 4.57 M/UL (ref 4–5.4)
SODIUM SERPL-SCNC: 139 MMOL/L (ref 136–145)
WBC # BLD AUTO: 9.33 K/UL (ref 3.9–12.7)

## 2023-08-02 PROCEDURE — 99215 OFFICE O/P EST HI 40 MIN: CPT | Mod: S$PBB,,, | Performed by: INTERNAL MEDICINE

## 2023-08-02 PROCEDURE — 99999 PR PBB SHADOW E&M-EST. PATIENT-LVL IV: ICD-10-PCS | Mod: PBBFAC,,, | Performed by: INTERNAL MEDICINE

## 2023-08-02 PROCEDURE — 1159F MED LIST DOCD IN RCRD: CPT | Mod: CPTII,,, | Performed by: INTERNAL MEDICINE

## 2023-08-02 PROCEDURE — 36415 COLL VENOUS BLD VENIPUNCTURE: CPT | Mod: PO | Performed by: INTERNAL MEDICINE

## 2023-08-02 PROCEDURE — 3008F PR BODY MASS INDEX (BMI) DOCUMENTED: ICD-10-PCS | Mod: CPTII,,, | Performed by: INTERNAL MEDICINE

## 2023-08-02 PROCEDURE — 1159F PR MEDICATION LIST DOCUMENTED IN MEDICAL RECORD: ICD-10-PCS | Mod: CPTII,,, | Performed by: INTERNAL MEDICINE

## 2023-08-02 PROCEDURE — 3008F BODY MASS INDEX DOCD: CPT | Mod: CPTII,,, | Performed by: INTERNAL MEDICINE

## 2023-08-02 PROCEDURE — 80053 COMPREHEN METABOLIC PANEL: CPT | Performed by: INTERNAL MEDICINE

## 2023-08-02 PROCEDURE — 99999 PR PBB SHADOW E&M-EST. PATIENT-LVL IV: CPT | Mod: PBBFAC,,, | Performed by: INTERNAL MEDICINE

## 2023-08-02 PROCEDURE — 3074F SYST BP LT 130 MM HG: CPT | Mod: CPTII,,, | Performed by: INTERNAL MEDICINE

## 2023-08-02 PROCEDURE — 85025 COMPLETE CBC W/AUTO DIFF WBC: CPT | Performed by: INTERNAL MEDICINE

## 2023-08-02 PROCEDURE — 3074F PR MOST RECENT SYSTOLIC BLOOD PRESSURE < 130 MM HG: ICD-10-PCS | Mod: CPTII,,, | Performed by: INTERNAL MEDICINE

## 2023-08-02 PROCEDURE — 3079F DIAST BP 80-89 MM HG: CPT | Mod: CPTII,,, | Performed by: INTERNAL MEDICINE

## 2023-08-02 PROCEDURE — 99214 OFFICE O/P EST MOD 30 MIN: CPT | Mod: PBBFAC,PO | Performed by: INTERNAL MEDICINE

## 2023-08-02 PROCEDURE — 99215 PR OFFICE/OUTPT VISIT, EST, LEVL V, 40-54 MIN: ICD-10-PCS | Mod: S$PBB,,, | Performed by: INTERNAL MEDICINE

## 2023-08-02 PROCEDURE — 3079F PR MOST RECENT DIASTOLIC BLOOD PRESSURE 80-89 MM HG: ICD-10-PCS | Mod: CPTII,,, | Performed by: INTERNAL MEDICINE

## 2023-08-02 RX ORDER — NABUMETONE 750 MG/1
750 TABLET, FILM COATED ORAL 2 TIMES DAILY
Qty: 60 TABLET | Refills: 6 | Status: SHIPPED | OUTPATIENT
Start: 2023-08-02 | End: 2023-12-08

## 2023-08-02 RX ORDER — KETOCONAZOLE 20 MG/G
CREAM TOPICAL
COMMUNITY
Start: 2023-06-16

## 2023-08-02 RX ORDER — VALACYCLOVIR HYDROCHLORIDE 1 G/1
1000 TABLET, FILM COATED ORAL 2 TIMES DAILY
COMMUNITY
Start: 2023-06-16 | End: 2023-12-08

## 2023-08-02 RX ORDER — TRIAMCINOLONE ACETONIDE 1 MG/G
CREAM TOPICAL
COMMUNITY
Start: 2023-06-16

## 2023-08-02 RX ORDER — METHOTREXATE 25 MG/ML
25 INJECTION, SOLUTION INTRA-ARTERIAL; INTRAMUSCULAR; INTRAVENOUS
Qty: 4 ML | Refills: 6 | Status: SHIPPED | OUTPATIENT
Start: 2023-08-02 | End: 2023-12-08

## 2023-08-02 NOTE — PROGRESS NOTES
RHEUMATOLOGY OUTPATIENT CLINIC NOTE    8/2/2023    Attending Rheumatologist: Jerrod Fernandez  Primary Care Provider: Jeremiah Varner MD   Physician Requesting Consultation: No referring provider defined for this encounter.  Chief Complaint/Reason For Consultation:  Follow-up      Subjective:       HPI  Sami Flowers is a 48 y.o. White female with medical history noted below who presents for evaluation of PsA.   Patient presents for evaluation of PsA. Patient diagnosed approximately 15 years ago. Prior therapies: MTX, Otezla and Humira. She was lost to follow up, and had insurance switch and has not seen a Rheumatologist in 3 years. Worse joints: Neck, Low back, hips, knees. +Swelling, Morning stiffness lasting hours. Pain improves with meds (Lyrica-stopped when she switched Pain docs), steroids injections. Pain worsens with prolonged standing or activity. +Rash, dactylitis, ankle swelling (no specific achilles enthesitis), Hx of Eye inflammation, constipation, HA, poor sleep, brain fog/forgeful, paraesthesias, myalgias, +FHX of PsO. No IBD, Randa.    Today  Patient here for follow up.   Has been feeling nauseous with the recent MTX 25mg obtained from pharmacy - new packaging so suspecting new    Didn't have last dose of MTX 25mg (last took on 7/25 Tuesday)   Has had an increase in pain - especially in lower back, bilateral hips, L trapezius     Recently went on a trip to [place] where she was more active   Had bilateral knee injections in May from pain management which has helped   Supposed to get injection in the back with pain management in June but missed it   Psoriasis has been fluctuating, coming back on elbows, hips and near hairline   Creams prescribed by dermatologist has been helping   Dose of suboxone has reduced to half a strip       Review of Systems   Constitutional:  Negative for chills, fatigue, fever and unexpected weight change.   HENT:  Negative for mouth sores and  trouble swallowing.    Eyes:  Negative for redness and eye dryness.   Respiratory:  Negative for cough and shortness of breath.    Cardiovascular:  Negative for chest pain.   Gastrointestinal:  Negative for abdominal distention, constipation, diarrhea, nausea and vomiting.   Genitourinary:  Negative for dysuria, genital sores and vaginal dryness.   Musculoskeletal:  Positive for arthralgias and back pain. Negative for gait problem, joint swelling, leg pain, myalgias, neck pain, neck stiffness and joint deformity.   Integumentary:  Negative for rash.   Neurological:  Negative for weakness, numbness and headaches.   Hematological:  Negative for adenopathy. Does not bruise/bleed easily.   Psychiatric/Behavioral:  Negative for confusion, decreased concentration and sleep disturbance. The patient is not nervous/anxious.    All other systems reviewed and are negative.       Chronic comorbid conditions affecting medical decision making today:  No past medical history on file.  Past Surgical History:   Procedure Laterality Date     SECTION      HYSTERECTOMY      TONSILLECTOMY      TUMOR REMOVAL      fibroid     No family history on file.  Social History     Substance and Sexual Activity   Alcohol Use None     Social History     Tobacco Use   Smoking Status Never   Smokeless Tobacco Not on file     Social History     Substance and Sexual Activity   Drug Use Not on file       Current Outpatient Medications:     buPROPion (WELLBUTRIN XL) 150 MG TB24 tablet, Take 150 mg by mouth every morning., Disp: , Rfl:     celecoxib (CELEBREX) 200 MG capsule, Take 1 capsule (200 mg total) by mouth 2 (two) times daily., Disp: 60 capsule, Rfl: 6    cloNIDine 0.2 mg/24 hr td ptwk (CATAPRES) 0.2 mg/24 hr, , Disp: , Rfl:     cyclobenzaprine (FLEXERIL) 10 MG tablet, Take 1 tablet (10 mg total) by mouth 3 (three) times daily as needed for Muscle spasms (chronic pain)., Disp: 30 tablet, Rfl: 3    DULoxetine (CYMBALTA) 60 MG capsule,  duloxetine 60 mg capsule,delayed release  TAKE ONE CAPSULE BY MOUTH EVERY MORNING (REPLACES FLUOXETINE), Disp: , Rfl:     ergocalciferol (ERGOCALCIFEROL) 50,000 unit Cap, Take 1 capsule (50,000 Units total) by mouth every 7 days., Disp: 12 capsule, Rfl: 0    FLUoxetine 10 MG capsule, Take 10 mg by mouth once daily., Disp: , Rfl:     FLUoxetine 20 MG capsule, Take 20 mg by mouth once daily., Disp: , Rfl:     folic acid (FOLVITE) 1 MG tablet, Take 2 tablets (2 mg total) by mouth once daily., Disp: 60 tablet, Rfl: 4    hydrOXYzine (ATARAX) 50 MG tablet, hydroxyzine HCl 50 mg tablet  TAKE ONE TABLET BY MOUTH THREE TIMES DAILY, Disp: , Rfl:     hydrOXYzine pamoate (VISTARIL) 50 MG Cap, Take 100 mg by mouth 2 (two) times daily as needed., Disp: , Rfl:     levothyroxine (SYNTHROID, LEVOTHROID) 175 MCG tablet, TAKE 1 tablet (175 mcg total) by mouth before breakfast for thyroid, Disp: 70 tablet, Rfl: 3    methotrexate 25 mg/mL injection, Inject 1 mL (25 mg total) into the skin every 7 days., Disp: 4 mL, Rfl: 6    mirtazapine (REMERON) 15 MG tablet, Take 15 mg by mouth every evening., Disp: , Rfl:     naproxen (NAPROSYN) 500 MG tablet, Take 1 tablet (500 mg total) by mouth 2 (two) times daily as needed (headaches (take with meals, do not take with ibuprofen))., Disp: 40 tablet, Rfl: 1    ondansetron (ZOFRAN-ODT) 8 MG TbDL, Take 1 tablet (8 mg total) by mouth every 6 (six) hours as needed (nausea/vomiting)., Disp: 30 tablet, Rfl: 2    pulse oximeter (PULSE OXIMETER) device, by Apply Externally route 2 (two) times a day. Use twice daily at 8 AM and 3 PM and record the value in [facility] as directed., Disp: 1 each, Rfl: 0    SUBOXONE 8-2 mg, SMARTSI.5 Strip(s) Sublingual 3 Times Daily, Disp: , Rfl:     sumatriptan (IMITREX) 100 MG tablet, TAKE 1 TABLET AS SINGLE DOSE, MAY REPEAT 1 DOSE IN 2 HRS IF SYMPTOMS PERSIST/RETURN, MAX DOSE IS 100MG/DOSE, 200MG PER 24 HRS, Disp: 18 tablet, Rfl: 2    venlafaxine (EFFEXOR-XR) 150 MG Cp24,  Take 150 mg by mouth., Disp: , Rfl:      Objective:         There were no vitals filed for this visit.    Physical Exam   Musculoskeletal:      Right shoulder: Normal.      Left shoulder: Normal.      Right elbow: Normal.      Left elbow: Normal.      Right wrist: Normal.      Left wrist: Normal.      Right knee: Normal.      Left knee: Normal.      Comments: Can make fist, no synovitis though multiple MCP/PIP with TTP  Knee Crepitus   Hip and Lumbar ROM oK  Ankle with TTP  Negative MTP Squeeze test   Tender Points:  No   Yes  [ ]    [x ]   Low cervical anterior aspect    [ ]    [x ]   Costochondral Junction   [ ]    [x ]   Lateral Epicondyle  [ ]    [x ]   Suboccipital   [ ]    [x ]   Trapezius   [ ]    [x ]   Supraspinatus   [ ]    [x ]   Gluteal   [ ]    [x ]   Greater trochanter  [ ]    [x ]   Knee       Skin: Rash noted.   Skin excoriations on feet       Right Side Rheumatological Exam     Examination finds the shoulder, elbow, wrist, knee, 1st PIP, 1st MCP, 2nd PIP, 2nd MCP, 3rd PIP, 3rd MCP, 4th PIP, 4th MCP, 5th PIP and 5th MCP normal.    Left Side Rheumatological Exam     Examination finds the shoulder, elbow, wrist, knee, 1st PIP, 1st MCP, 2nd PIP, 2nd MCP, 3rd PIP, 3rd MCP, 4th PIP, 4th MCP, 5th PIP and 5th MCP normal.      Metatarsals all tender on palpation    Reviewed old and all outside pertinent medical records available.    All lab results personally reviewed and interpreted by me.  Lab Results   Component Value Date    WBC 16.03 (H) 04/05/2023    HGB 14.0 04/05/2023    HCT 44.3 04/05/2023    MCV 90 04/05/2023    MCH 28.6 04/05/2023    MCHC 31.6 (L) 04/05/2023    RDW 15.9 (H) 04/05/2023     (H) 04/05/2023    MPV 10.6 04/05/2023       Lab Results   Component Value Date     04/05/2023    K 4.5 04/05/2023     04/05/2023    CO2 20 (L) 04/05/2023     (H) 04/05/2023    BUN 11 04/05/2023    CALCIUM 9.6 04/05/2023    PROT 8.3 04/05/2023    ALBUMIN 3.3 (L) 04/05/2023    BILITOT 0.2  "04/05/2023    AST 8 (L) 04/05/2023    ALKPHOS 141 (H) 04/05/2023    ALT 7 (L) 04/05/2023       No results found for: "COLORU", "APPEARANCEUA", "SPECGRAV", "PHUR", "PHUA", "PROTEINUA", "GLUCOSEU", "KETONESU", "BLOODU", "LEUKOCYTESUR", "NITRITE", "UROBILINOGEN"    Lab Results   Component Value Date    CRP 10.5 (H) 04/05/2023       Lab Results   Component Value Date    SEDRATE 60 (H) 04/05/2023       Lab Results   Component Value Date    RF <13.0 08/04/2022    SEDRATE 60 (H) 04/05/2023       No components found for: "25OHVITDTOT", "02JJYVBU5", "16DKXWOR9", "METHODNOTE"    Lab Results   Component Value Date    URICACID 3.6 04/05/2023       No components found for: "TSPOTTB"        Imaging:  All imaging reviewed and independently interpreted by me.         ASSESSMENT / PLAN:     Sami Flowers is a 48 y.o. White female with:      1. Psoriatic arthritis  - patient with Hx of PsA  - continue MTX 25mg subq weekly but will order from another pharmacy   - contact if nausea persists with new MTX and will switch to an alternative   - update labs   - reassurance     2. Primary osteoarthritis involving multiple joints  - chronic   - wt loss  - Tylenol PRN   - reassurance and exercise     3. Fibromyalgia  - in addition to above she has FM which will make her pain goals harder to achieve  - continue with pain management for dose reduction of suboxone in order to transition to Lyrica   - sleep hygiene and stress management reinforced  - reassurance and exercise     4. Encounter for long-term (current) use of other medications  - Daily FA  - monitor labs  - vaccines per guidelines  - immunosuppression/infectious precautions discussed    5. Other specified counseling  - over 10 minutes spent regarding below topics:  - Immunization counseling done.  - Weight loss counseling done.  - Nutrition and exercise counseling.  - Limitation of alcohol consumption.  - Regular exercise:  Aerobic and resistance.  - Medication counseling " provided.    6. Obesity  - would benefit from decreasing at least 10% of body weight.  - recommended goal of losing 1 lb per week.  - consider nutritionist evaluation.  - would consider screening for DARINEL per PMD.    Follow up in 3 months     Samira Sol MD4  Rheumatology Department   [facility]

## 2023-08-02 NOTE — PROGRESS NOTES
See attestation   Answers submitted by the patient for this visit:  Rheumatology Questionnaire (Submitted on 8/2/2023)  fever: No  eye redness: No  mouth sores: No  headaches: Yes  shortness of breath: No  chest pain: No  trouble swallowing: No  diarrhea: No  constipation: No  unexpected weight change: Yes  genital sore: No  dysuria: No  During the last 3 days, have you had a skin rash?: Yes  Bruises or bleeds easily: Yes  cough: No

## 2023-08-25 ENCOUNTER — PATIENT MESSAGE (OUTPATIENT)
Dept: RHEUMATOLOGY | Facility: CLINIC | Age: 48
End: 2023-08-25
Payer: MEDICAID

## 2023-08-25 DIAGNOSIS — M06.9 RHEUMATOID ARTHRITIS, INVOLVING UNSPECIFIED SITE, UNSPECIFIED WHETHER RHEUMATOID FACTOR PRESENT: Primary | ICD-10-CM

## 2023-08-29 RX ORDER — METHOTREXATE 2.5 MG/1
15 TABLET ORAL
Qty: 24 TABLET | Refills: 3 | Status: SHIPPED | OUTPATIENT
Start: 2023-08-29 | End: 2023-10-16 | Stop reason: SDUPTHER

## 2023-10-13 ENCOUNTER — PATIENT MESSAGE (OUTPATIENT)
Dept: RHEUMATOLOGY | Facility: CLINIC | Age: 48
End: 2023-10-13
Payer: MEDICAID

## 2023-10-13 DIAGNOSIS — M06.9 RHEUMATOID ARTHRITIS, INVOLVING UNSPECIFIED SITE, UNSPECIFIED WHETHER RHEUMATOID FACTOR PRESENT: ICD-10-CM

## 2023-10-16 RX ORDER — METHOTREXATE 2.5 MG/1
15 TABLET ORAL
Qty: 24 TABLET | Refills: 3 | Status: SHIPPED | OUTPATIENT
Start: 2023-10-16 | End: 2024-10-15

## 2023-10-26 ENCOUNTER — PATIENT MESSAGE (OUTPATIENT)
Dept: RHEUMATOLOGY | Facility: CLINIC | Age: 48
End: 2023-10-26
Payer: MEDICAID

## 2023-10-31 ENCOUNTER — PATIENT MESSAGE (OUTPATIENT)
Dept: PRIMARY CARE CLINIC | Facility: CLINIC | Age: 48
End: 2023-10-31
Payer: MEDICAID

## 2023-11-01 ENCOUNTER — PATIENT MESSAGE (OUTPATIENT)
Dept: RHEUMATOLOGY | Facility: CLINIC | Age: 48
End: 2023-11-01
Payer: MEDICAID

## 2023-11-03 ENCOUNTER — OFFICE VISIT (OUTPATIENT)
Dept: PRIMARY CARE CLINIC | Facility: CLINIC | Age: 48
End: 2023-11-03
Payer: MEDICAID

## 2023-11-03 VITALS
DIASTOLIC BLOOD PRESSURE: 71 MMHG | SYSTOLIC BLOOD PRESSURE: 110 MMHG | HEART RATE: 96 BPM | WEIGHT: 195.44 LBS | BODY MASS INDEX: 32.52 KG/M2

## 2023-11-03 DIAGNOSIS — S22.43XD MULTIPLE CLOSED FRACTURES OF RIBS OF BOTH SIDES WITH ROUTINE HEALING, SUBSEQUENT ENCOUNTER: ICD-10-CM

## 2023-11-03 DIAGNOSIS — S22.20XD CLOSED FRACTURE OF STERNUM WITH ROUTINE HEALING, UNSPECIFIED PORTION OF STERNUM, SUBSEQUENT ENCOUNTER: Primary | ICD-10-CM

## 2023-11-03 DIAGNOSIS — J96.11 CHRONIC HYPOXEMIC RESPIRATORY FAILURE: ICD-10-CM

## 2023-11-03 PROCEDURE — 99999 PR PBB SHADOW E&M-EST. PATIENT-LVL IV: CPT | Mod: PBBFAC,,, | Performed by: FAMILY MEDICINE

## 2023-11-03 PROCEDURE — 3074F PR MOST RECENT SYSTOLIC BLOOD PRESSURE < 130 MM HG: ICD-10-PCS | Mod: CPTII,,, | Performed by: FAMILY MEDICINE

## 2023-11-03 PROCEDURE — 3008F PR BODY MASS INDEX (BMI) DOCUMENTED: ICD-10-PCS | Mod: CPTII,,, | Performed by: FAMILY MEDICINE

## 2023-11-03 PROCEDURE — 99214 OFFICE O/P EST MOD 30 MIN: CPT | Mod: S$PBB,,, | Performed by: FAMILY MEDICINE

## 2023-11-03 PROCEDURE — 1159F MED LIST DOCD IN RCRD: CPT | Mod: CPTII,,, | Performed by: FAMILY MEDICINE

## 2023-11-03 PROCEDURE — 3008F BODY MASS INDEX DOCD: CPT | Mod: CPTII,,, | Performed by: FAMILY MEDICINE

## 2023-11-03 PROCEDURE — 3074F SYST BP LT 130 MM HG: CPT | Mod: CPTII,,, | Performed by: FAMILY MEDICINE

## 2023-11-03 PROCEDURE — 99214 OFFICE O/P EST MOD 30 MIN: CPT | Mod: PBBFAC,PN | Performed by: FAMILY MEDICINE

## 2023-11-03 PROCEDURE — 99214 PR OFFICE/OUTPT VISIT, EST, LEVL IV, 30-39 MIN: ICD-10-PCS | Mod: S$PBB,,, | Performed by: FAMILY MEDICINE

## 2023-11-03 PROCEDURE — 99999 PR PBB SHADOW E&M-EST. PATIENT-LVL IV: ICD-10-PCS | Mod: PBBFAC,,, | Performed by: FAMILY MEDICINE

## 2023-11-03 PROCEDURE — 3078F DIAST BP <80 MM HG: CPT | Mod: CPTII,,, | Performed by: FAMILY MEDICINE

## 2023-11-03 PROCEDURE — 1159F PR MEDICATION LIST DOCUMENTED IN MEDICAL RECORD: ICD-10-PCS | Mod: CPTII,,, | Performed by: FAMILY MEDICINE

## 2023-11-03 PROCEDURE — 3078F PR MOST RECENT DIASTOLIC BLOOD PRESSURE < 80 MM HG: ICD-10-PCS | Mod: CPTII,,, | Performed by: FAMILY MEDICINE

## 2023-11-03 RX ORDER — ALBUTEROL SULFATE 90 UG/1
AEROSOL, METERED RESPIRATORY (INHALATION)
COMMUNITY
End: 2023-12-08

## 2023-11-03 RX ORDER — ALBUTEROL SULFATE 90 UG/1
1 AEROSOL, METERED RESPIRATORY (INHALATION) 2 TIMES DAILY
COMMUNITY
End: 2023-12-08

## 2023-11-03 RX ORDER — CLOTRIMAZOLE AND BETAMETHASONE DIPROPIONATE 10; .64 MG/G; MG/G
CREAM TOPICAL
COMMUNITY
End: 2023-12-08

## 2023-11-03 RX ORDER — SULFAMETHOXAZOLE AND TRIMETHOPRIM 400; 80 MG/1; MG/1
TABLET ORAL
COMMUNITY
End: 2023-12-08

## 2023-11-03 RX ORDER — GABAPENTIN 300 MG/1
600 CAPSULE ORAL 3 TIMES DAILY
COMMUNITY
Start: 2023-10-27 | End: 2023-12-08

## 2023-11-03 RX ORDER — DULOXETIN HYDROCHLORIDE 60 MG/1
CAPSULE, DELAYED RELEASE ORAL
COMMUNITY
End: 2023-12-08

## 2023-11-03 RX ORDER — PREGABALIN 200 MG/1
1 CAPSULE ORAL 3 TIMES DAILY
COMMUNITY
End: 2023-12-08

## 2023-11-03 RX ORDER — PROMETHAZINE HYDROCHLORIDE 25 MG/1
TABLET ORAL
COMMUNITY
End: 2023-12-08

## 2023-11-03 RX ORDER — PANTOPRAZOLE SODIUM 40 MG/1
TABLET, DELAYED RELEASE ORAL
COMMUNITY
End: 2023-12-08

## 2023-11-03 RX ORDER — ACETAMINOPHEN 500 MG
1000 TABLET ORAL
COMMUNITY
Start: 2023-10-27 | End: 2023-11-06

## 2023-11-03 RX ORDER — DICLOFENAC SODIUM 75 MG/1
1 TABLET, DELAYED RELEASE ORAL 2 TIMES DAILY
COMMUNITY
End: 2023-12-08

## 2023-11-03 RX ORDER — CEPHALEXIN 250 MG/1
CAPSULE ORAL
COMMUNITY
End: 2023-12-08

## 2023-11-03 RX ORDER — FAMOTIDINE 20 MG/1
1 TABLET, FILM COATED ORAL 2 TIMES DAILY
COMMUNITY

## 2023-11-03 RX ORDER — HYDROCODONE BITARTRATE AND ACETAMINOPHEN 5; 325 MG/1; MG/1
1 TABLET ORAL EVERY 6 HOURS PRN
COMMUNITY
End: 2023-11-03

## 2023-11-03 RX ORDER — LEVOTHYROXINE SODIUM 150 UG/1
TABLET ORAL
COMMUNITY
End: 2023-12-08

## 2023-11-03 RX ORDER — LIDOCAINE 560 MG/1
1 PATCH PERCUTANEOUS; TOPICAL; TRANSDERMAL
COMMUNITY
Start: 2023-10-27 | End: 2023-11-03

## 2023-11-03 RX ORDER — TRAZODONE HYDROCHLORIDE 100 MG/1
TABLET ORAL
COMMUNITY
End: 2023-12-08

## 2023-11-03 RX ORDER — TERBINAFINE HYDROCHLORIDE 250 MG/1
TABLET ORAL
COMMUNITY
End: 2023-11-16

## 2023-11-03 RX ORDER — METHOCARBAMOL 750 MG/1
1 TABLET, FILM COATED ORAL
COMMUNITY
End: 2023-11-16

## 2023-11-03 RX ORDER — FLUCONAZOLE 150 MG/1
TABLET ORAL
COMMUNITY
End: 2023-11-16

## 2023-11-03 RX ORDER — OXYCODONE AND ACETAMINOPHEN 5; 325 MG/1; MG/1
1 TABLET ORAL EVERY 8 HOURS PRN
Qty: 45 TABLET | Refills: 0 | Status: SHIPPED | OUTPATIENT
Start: 2023-11-03 | End: 2023-12-06 | Stop reason: SDUPTHER

## 2023-11-03 RX ORDER — ERGOCALCIFEROL 1.25 MG/1
1 CAPSULE ORAL
COMMUNITY
End: 2023-12-08

## 2023-11-03 RX ORDER — CLONIDINE HYDROCHLORIDE 0.2 MG/1
0.2 TABLET ORAL NIGHTLY
COMMUNITY
Start: 2023-10-03

## 2023-11-03 RX ORDER — HYDROXYZINE HYDROCHLORIDE 50 MG/1
1 TABLET, FILM COATED ORAL 3 TIMES DAILY
COMMUNITY
End: 2023-12-08

## 2023-11-03 RX ORDER — NYSTATIN 100000 U/G
1 OINTMENT TOPICAL 2 TIMES DAILY
COMMUNITY
End: 2023-11-16

## 2023-11-03 RX ORDER — IBUPROFEN 200 MG
1 TABLET ORAL
COMMUNITY
Start: 2023-10-27 | End: 2023-11-26

## 2023-11-03 RX ORDER — METHOCARBAMOL 500 MG/1
500 TABLET, FILM COATED ORAL
COMMUNITY
Start: 2023-10-27 | End: 2023-11-06

## 2023-11-03 RX ORDER — CLOBETASOL PROPIONATE 0.46 MG/ML
SOLUTION TOPICAL
COMMUNITY
End: 2023-12-08
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
## 2023-11-11 ENCOUNTER — PATIENT MESSAGE (OUTPATIENT)
Dept: ADMINISTRATIVE | Facility: HOSPITAL | Age: 48
End: 2023-11-11
Payer: MEDICAID

## 2023-11-13 ENCOUNTER — PATIENT MESSAGE (OUTPATIENT)
Dept: PRIMARY CARE CLINIC | Facility: CLINIC | Age: 48
End: 2023-11-13
Payer: MEDICAID

## 2023-11-13 RX ORDER — ONDANSETRON 8 MG/1
8 TABLET, ORALLY DISINTEGRATING ORAL EVERY 6 HOURS PRN
Qty: 30 TABLET | Refills: 2 | Status: SHIPPED | OUTPATIENT
Start: 2023-11-13 | End: 2024-02-15

## 2023-11-13 NOTE — TELEPHONE ENCOUNTER
No care due was identified.  [facility] Embedded Care Due Messages. Reference number: [id number].   11/13/2023 10:04:34 AM CST

## 2023-11-15 ENCOUNTER — PATIENT MESSAGE (OUTPATIENT)
Dept: PRIMARY CARE CLINIC | Facility: CLINIC | Age: 48
End: 2023-11-15
Payer: MEDICAID

## 2023-11-15 RX ORDER — SUMATRIPTAN SUCCINATE 100 MG/1
TABLET ORAL
Qty: 18 TABLET | Refills: 2 | Status: SHIPPED | OUTPATIENT
Start: 2023-11-15

## 2023-11-16 ENCOUNTER — PATIENT MESSAGE (OUTPATIENT)
Dept: PRIMARY CARE CLINIC | Facility: CLINIC | Age: 48
End: 2023-11-16

## 2023-11-16 ENCOUNTER — OFFICE VISIT (OUTPATIENT)
Dept: PRIMARY CARE CLINIC | Facility: CLINIC | Age: 48
End: 2023-11-16
Payer: MEDICAID

## 2023-11-16 DIAGNOSIS — L30.4 INTERTRIGO: Primary | ICD-10-CM

## 2023-11-16 DIAGNOSIS — M62.838 MUSCLE SPASM: ICD-10-CM

## 2023-11-16 PROCEDURE — 99214 OFFICE O/P EST MOD 30 MIN: CPT | Mod: 95,,, | Performed by: FAMILY MEDICINE

## 2023-11-16 PROCEDURE — 99214 PR OFFICE/OUTPT VISIT, EST, LEVL IV, 30-39 MIN: ICD-10-PCS | Mod: 95,,, | Performed by: FAMILY MEDICINE

## 2023-11-16 RX ORDER — CLOTRIMAZOLE 1 %
CREAM (GRAM) TOPICAL 2 TIMES DAILY
Qty: 85 G | Refills: 2 | Status: SHIPPED | OUTPATIENT
Start: 2023-11-16

## 2023-11-16 RX ORDER — FLUCONAZOLE 150 MG/1
150 TABLET ORAL WEEKLY
Qty: 4 TABLET | Refills: 0 | Status: SHIPPED | OUTPATIENT
Start: 2023-11-16 | End: 2023-12-08

## 2023-11-16 RX ORDER — CYCLOBENZAPRINE HCL 10 MG
10 TABLET ORAL 3 TIMES DAILY PRN
Qty: 30 TABLET | Refills: 3 | Status: SHIPPED | OUTPATIENT
Start: 2023-11-16

## 2023-11-16 RX ORDER — NYSTATIN 100000 [USP'U]/G
10 POWDER TOPICAL 2 TIMES DAILY
Qty: 60 G | Refills: 3 | Status: SHIPPED | OUTPATIENT
Start: 2023-11-16
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
## 2023-11-21 ENCOUNTER — PATIENT MESSAGE (OUTPATIENT)
Dept: PRIMARY CARE CLINIC | Facility: CLINIC | Age: 48
End: 2023-11-21
Payer: MEDICAID

## 2023-11-22 ENCOUNTER — PATIENT MESSAGE (OUTPATIENT)
Dept: PRIMARY CARE CLINIC | Facility: CLINIC | Age: 48
End: 2023-11-22
Payer: MEDICAID

## 2023-11-28 ENCOUNTER — TELEPHONE (OUTPATIENT)
Dept: PRIMARY CARE CLINIC | Facility: CLINIC | Age: 48
End: 2023-11-28

## 2023-11-28 NOTE — TELEPHONE ENCOUNTER
----- Message from Susana Lobato sent at 11/28/2023 10:46 AM CST -----  Contact: [phone]  Alessia with [facility] is calling to request additional orders for physical therapy for strength balance and gate training 1 time a week for 4 weeks if dr mason send to [facility]

## 2023-11-30 ENCOUNTER — PATIENT MESSAGE (OUTPATIENT)
Dept: PRIMARY CARE CLINIC | Facility: CLINIC | Age: 48
End: 2023-11-30
Payer: MEDICAID

## 2023-12-06 ENCOUNTER — OFFICE VISIT (OUTPATIENT)
Dept: PRIMARY CARE CLINIC | Facility: CLINIC | Age: 48
End: 2023-12-06
Payer: MEDICAID

## 2023-12-06 DIAGNOSIS — R10.9 ABDOMINAL CRAMPING: Primary | ICD-10-CM

## 2023-12-06 DIAGNOSIS — K59.00 CONSTIPATION, UNSPECIFIED CONSTIPATION TYPE: ICD-10-CM

## 2023-12-06 DIAGNOSIS — S22.43XD MULTIPLE CLOSED FRACTURES OF RIBS OF BOTH SIDES WITH ROUTINE HEALING, SUBSEQUENT ENCOUNTER: ICD-10-CM

## 2023-12-06 DIAGNOSIS — S22.20XD CLOSED FRACTURE OF STERNUM WITH ROUTINE HEALING, UNSPECIFIED PORTION OF STERNUM, SUBSEQUENT ENCOUNTER: ICD-10-CM

## 2023-12-06 PROCEDURE — 99214 OFFICE O/P EST MOD 30 MIN: CPT | Mod: 95,,, | Performed by: FAMILY MEDICINE

## 2023-12-06 PROCEDURE — 99214 PR OFFICE/OUTPT VISIT, EST, LEVL IV, 30-39 MIN: ICD-10-PCS | Mod: 95,,, | Performed by: FAMILY MEDICINE

## 2023-12-06 RX ORDER — POLYETHYLENE GLYCOL 3350 17 G/17G
17 POWDER, FOR SOLUTION ORAL DAILY
Qty: 30 EACH | Refills: 0 | Status: CANCELLED | OUTPATIENT
Start: 2023-12-06

## 2023-12-06 RX ORDER — POLYETHYLENE GLYCOL 3350 17 G/17G
17 POWDER, FOR SOLUTION ORAL DAILY
Qty: 238 G | Refills: 2 | Status: SHIPPED | OUTPATIENT
Start: 2023-12-06

## 2023-12-06 RX ORDER — OXYCODONE AND ACETAMINOPHEN 5; 325 MG/1; MG/1
1 TABLET ORAL
Qty: 30 TABLET | Refills: 0 | Status: SHIPPED | OUTPATIENT
Start: 2023-12-06
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
## 2023-12-07 ENCOUNTER — HOSPITAL ENCOUNTER (INPATIENT)
Facility: HOSPITAL | Age: 48
LOS: 4 days | Discharge: HOME | DRG: 392 | End: 2023-12-11
Attending: EMERGENCY MEDICINE | Admitting: STUDENT IN AN ORGANIZED HEALTH CARE EDUCATION/TRAINING PROGRAM
Payer: MEDICAID

## 2023-12-07 DIAGNOSIS — R07.89 CHEST TIGHTNESS: ICD-10-CM

## 2023-12-07 DIAGNOSIS — R10.84 GENERALIZED ABDOMINAL PAIN: Primary | ICD-10-CM

## 2023-12-07 DIAGNOSIS — K56.609 SMALL BOWEL OBSTRUCTION: ICD-10-CM

## 2023-12-07 LAB
ALBUMIN SERPL BCP-MCNC: 3.3 G/DL (ref 3.5–5.2)
ALP SERPL-CCNC: 151 U/L (ref 55–135)
ALT SERPL W/O P-5'-P-CCNC: 11 U/L (ref 10–44)
ANION GAP SERPL CALC-SCNC: 13 MMOL/L (ref 8–16)
AST SERPL-CCNC: 13 U/L (ref 10–40)
BASOPHILS # BLD AUTO: 0.05 K/UL (ref 0–0.2)
BASOPHILS NFR BLD: 0.4 % (ref 0–1.9)
BILIRUB SERPL-MCNC: 0.3 MG/DL (ref 0.1–1)
BUN SERPL-MCNC: 9 MG/DL (ref 6–20)
CALCIUM SERPL-MCNC: 9.3 MG/DL (ref 8.7–10.5)
CHLORIDE SERPL-SCNC: 103 MMOL/L (ref 95–110)
CO2 SERPL-SCNC: 24 MMOL/L (ref 23–29)
CREAT SERPL-MCNC: 0.7 MG/DL (ref 0.5–1.4)
DIFFERENTIAL METHOD: ABNORMAL
EOSINOPHIL # BLD AUTO: 0.1 K/UL (ref 0–0.5)
EOSINOPHIL NFR BLD: 0.6 % (ref 0–8)
ERYTHROCYTE [DISTWIDTH] IN BLOOD BY AUTOMATED COUNT: 15.1 % (ref 11.5–14.5)
EST. GFR  (NO RACE VARIABLE): >60 ML/MIN/1.73 M^2
GLUCOSE SERPL-MCNC: 114 MG/DL (ref 70–110)
HCT VFR BLD AUTO: 39.2 % (ref 37–48.5)
HGB BLD-MCNC: 13.3 G/DL (ref 12–16)
IMM GRANULOCYTES # BLD AUTO: 0.04 K/UL (ref 0–0.04)
IMM GRANULOCYTES NFR BLD AUTO: 0.3 % (ref 0–0.5)
LIPASE SERPL-CCNC: 3 U/L (ref 4–60)
LYMPHOCYTES # BLD AUTO: 3.4 K/UL (ref 1–4.8)
LYMPHOCYTES NFR BLD: 27.2 % (ref 18–48)
MCH RBC QN AUTO: 30.1 PG (ref 27–31)
MCHC RBC AUTO-ENTMCNC: 33.9 G/DL (ref 32–36)
MCV RBC AUTO: 89 FL (ref 82–98)
MONOCYTES # BLD AUTO: 0.6 K/UL (ref 0.3–1)
MONOCYTES NFR BLD: 4.6 % (ref 4–15)
NEUTROPHILS # BLD AUTO: 8.5 K/UL (ref 1.8–7.7)
NEUTROPHILS NFR BLD: 66.9 % (ref 38–73)
NRBC BLD-RTO: 0 /100 WBC
PLATELET # BLD AUTO: 475 K/UL (ref 150–450)
PMV BLD AUTO: 9.3 FL (ref 9.2–12.9)
POTASSIUM SERPL-SCNC: 3.6 MMOL/L (ref 3.5–5.1)
PROT SERPL-MCNC: 8 G/DL (ref 6–8.4)
RBC # BLD AUTO: 4.42 M/UL (ref 4–5.4)
SODIUM SERPL-SCNC: 140 MMOL/L (ref 136–145)
WBC # BLD AUTO: 12.64 K/UL (ref 3.9–12.7)

## 2023-12-07 PROCEDURE — 80053 COMPREHEN METABOLIC PANEL: CPT | Performed by: NURSE PRACTITIONER

## 2023-12-07 PROCEDURE — 96374 THER/PROPH/DIAG INJ IV PUSH: CPT

## 2023-12-07 PROCEDURE — 25000003 PHARM REV CODE 250: Performed by: EMERGENCY MEDICINE

## 2023-12-07 PROCEDURE — 63600175 PHARM REV CODE 636 W HCPCS: Performed by: NURSE PRACTITIONER

## 2023-12-07 PROCEDURE — 96375 TX/PRO/DX INJ NEW DRUG ADDON: CPT

## 2023-12-07 PROCEDURE — 11000001 HC ACUTE MED/SURG PRIVATE ROOM

## 2023-12-07 PROCEDURE — 83690 ASSAY OF LIPASE: CPT | Performed by: NURSE PRACTITIONER

## 2023-12-07 PROCEDURE — 85025 COMPLETE CBC W/AUTO DIFF WBC: CPT | Performed by: NURSE PRACTITIONER

## 2023-12-07 RX ORDER — FAMOTIDINE 10 MG/ML
20 INJECTION INTRAVENOUS
Status: COMPLETED | OUTPATIENT
Start: 2023-12-07 | End: 2023-12-07

## 2023-12-07 RX ORDER — SYRING-NEEDL,DISP,INSUL,0.3 ML 29 G X1/2"
296 SYRINGE, EMPTY DISPOSABLE MISCELLANEOUS
Status: COMPLETED | OUTPATIENT
Start: 2023-12-07 | End: 2023-12-07

## 2023-12-07 RX ORDER — PSEUDOEPHEDRINE/ACETAMINOPHEN 30MG-500MG
100 TABLET ORAL
Status: COMPLETED | OUTPATIENT
Start: 2023-12-07 | End: 2023-12-07

## 2023-12-07 RX ORDER — ONDANSETRON 2 MG/ML
4 INJECTION INTRAMUSCULAR; INTRAVENOUS
Status: COMPLETED | OUTPATIENT
Start: 2023-12-07 | End: 2023-12-07

## 2023-12-07 RX ORDER — ONDANSETRON 2 MG/ML
4 INJECTION INTRAMUSCULAR; INTRAVENOUS
Status: COMPLETED | OUTPATIENT
Start: 2023-12-08 | End: 2023-12-08

## 2023-12-07 RX ORDER — MORPHINE SULFATE 4 MG/ML
4 INJECTION, SOLUTION INTRAMUSCULAR; INTRAVENOUS
Status: COMPLETED | OUTPATIENT
Start: 2023-12-07 | End: 2023-12-07

## 2023-12-07 RX ORDER — MORPHINE SULFATE 4 MG/ML
4 INJECTION, SOLUTION INTRAMUSCULAR; INTRAVENOUS
Status: COMPLETED | OUTPATIENT
Start: 2023-12-08 | End: 2023-12-08

## 2023-12-07 RX ORDER — SUCRALFATE 1 G/10ML
1 SUSPENSION ORAL
Status: COMPLETED | OUTPATIENT
Start: 2023-12-07 | End: 2023-12-07

## 2023-12-07 RX ADMIN — MORPHINE SULFATE 4 MG: 4 INJECTION INTRAVENOUS at 09:12

## 2023-12-07 RX ADMIN — FAMOTIDINE 20 MG: 10 INJECTION, SOLUTION INTRAVENOUS at 09:12

## 2023-12-07 RX ADMIN — BE HEALTH MAGNESIUM CITRATE ORAL SOLUTION - LEMON 296 ML: 1.75 LIQUID ORAL at 11:12

## 2023-12-07 RX ADMIN — Medication 100 ML: at 10:12

## 2023-12-07 RX ADMIN — SODIUM CHLORIDE 1000 ML: 9 INJECTION, SOLUTION INTRAVENOUS at 09:12

## 2023-12-07 RX ADMIN — ONDANSETRON 4 MG: 2 INJECTION INTRAMUSCULAR; INTRAVENOUS at 09:12

## 2023-12-07 RX ADMIN — SUCRALFATE 1 G: 1 SUSPENSION ORAL at 09:12

## 2023-12-07 RX ADMIN — SODIUM CHLORIDE 500 ML: 0.9 INJECTION, SOLUTION INTRAVENOUS at 11:12

## 2023-12-08 PROBLEM — R10.84 GENERALIZED ABDOMINAL PAIN: Status: ACTIVE | Noted: 2023-12-08

## 2023-12-08 PROCEDURE — 96375 TX/PRO/DX INJ NEW DRUG ADDON: CPT

## 2023-12-08 PROCEDURE — G0378 HOSPITAL OBSERVATION PER HR: HCPCS

## 2023-12-08 PROCEDURE — 99285 EMERGENCY DEPT VISIT HI MDM: CPT | Mod: 25

## 2023-12-08 PROCEDURE — 96376 TX/PRO/DX INJ SAME DRUG ADON: CPT

## 2023-12-08 PROCEDURE — 63600175 PHARM REV CODE 636 W HCPCS: Performed by: STUDENT IN AN ORGANIZED HEALTH CARE EDUCATION/TRAINING PROGRAM

## 2023-12-08 PROCEDURE — 63600175 PHARM REV CODE 636 W HCPCS: Performed by: EMERGENCY MEDICINE

## 2023-12-08 PROCEDURE — 99222 PR INITIAL HOSPITAL CARE,LEVL II: ICD-10-PCS | Mod: ,,, | Performed by: STUDENT IN AN ORGANIZED HEALTH CARE EDUCATION/TRAINING PROGRAM

## 2023-12-08 PROCEDURE — 25000003 PHARM REV CODE 250: Performed by: EMERGENCY MEDICINE

## 2023-12-08 PROCEDURE — 25000003 PHARM REV CODE 250: Performed by: STUDENT IN AN ORGANIZED HEALTH CARE EDUCATION/TRAINING PROGRAM

## 2023-12-08 PROCEDURE — 99222 1ST HOSP IP/OBS MODERATE 55: CPT | Mod: ,,, | Performed by: STUDENT IN AN ORGANIZED HEALTH CARE EDUCATION/TRAINING PROGRAM

## 2023-12-08 PROCEDURE — 11000001 HC ACUTE MED/SURG PRIVATE ROOM

## 2023-12-08 PROCEDURE — 25000003 PHARM REV CODE 250

## 2023-12-08 RX ORDER — KETOROLAC TROMETHAMINE 30 MG/ML
15 INJECTION, SOLUTION INTRAMUSCULAR; INTRAVENOUS EVERY 6 HOURS
Status: DISPENSED | OUTPATIENT
Start: 2023-12-08 | End: 2023-12-11

## 2023-12-08 RX ORDER — HYDROMORPHONE HYDROCHLORIDE 2 MG/ML
1 INJECTION, SOLUTION INTRAMUSCULAR; INTRAVENOUS; SUBCUTANEOUS
Status: COMPLETED | OUTPATIENT
Start: 2023-12-08 | End: 2023-12-08

## 2023-12-08 RX ORDER — SYRING-NEEDL,DISP,INSUL,0.3 ML 29 G X1/2"
296 SYRINGE, EMPTY DISPOSABLE MISCELLANEOUS ONCE
Status: COMPLETED | OUTPATIENT
Start: 2023-12-08 | End: 2023-12-08

## 2023-12-08 RX ORDER — SODIUM CHLORIDE 9 MG/ML
INJECTION, SOLUTION INTRAVENOUS CONTINUOUS
Status: DISCONTINUED | OUTPATIENT
Start: 2023-12-08 | End: 2023-12-11

## 2023-12-08 RX ORDER — VENLAFAXINE HYDROCHLORIDE 75 MG/1
225 CAPSULE, EXTENDED RELEASE ORAL DAILY
COMMUNITY
Start: 2023-10-03

## 2023-12-08 RX ORDER — POLYETHYLENE GLYCOL 3350 17 G/17G
17 POWDER, FOR SOLUTION ORAL DAILY
Status: DISCONTINUED | OUTPATIENT
Start: 2023-12-08 | End: 2023-12-10

## 2023-12-08 RX ORDER — PROCHLORPERAZINE EDISYLATE 5 MG/ML
5 INJECTION INTRAMUSCULAR; INTRAVENOUS EVERY 6 HOURS PRN
Status: DISCONTINUED | OUTPATIENT
Start: 2023-12-08 | End: 2023-12-11 | Stop reason: HOSPADM

## 2023-12-08 RX ORDER — POLYETHYLENE GLYCOL 3350 17 G/17G
17 POWDER, FOR SOLUTION ORAL 2 TIMES DAILY
Status: CANCELLED | OUTPATIENT
Start: 2023-12-08

## 2023-12-08 RX ORDER — CYCLOBENZAPRINE HCL 10 MG
10 TABLET ORAL 3 TIMES DAILY PRN
Status: DISCONTINUED | OUTPATIENT
Start: 2023-12-08 | End: 2023-12-11 | Stop reason: HOSPADM

## 2023-12-08 RX ORDER — MORPHINE SULFATE 4 MG/ML
2 INJECTION, SOLUTION INTRAMUSCULAR; INTRAVENOUS EVERY 4 HOURS PRN
Status: DISCONTINUED | OUTPATIENT
Start: 2023-12-08 | End: 2023-12-09

## 2023-12-08 RX ORDER — DEXTROMETHORPHAN POLISTIREX 30 MG/5 ML
1 SUSPENSION, EXTENDED RELEASE 12 HR ORAL ONCE
Status: CANCELLED | OUTPATIENT
Start: 2023-12-08 | End: 2023-12-08

## 2023-12-08 RX ORDER — OXYCODONE AND ACETAMINOPHEN 5; 325 MG/1; MG/1
1 TABLET ORAL EVERY 6 HOURS PRN
Status: DISCONTINUED | OUTPATIENT
Start: 2023-12-08 | End: 2023-12-09

## 2023-12-08 RX ORDER — TALC
6 POWDER (GRAM) TOPICAL NIGHTLY PRN
Status: DISCONTINUED | OUTPATIENT
Start: 2023-12-08 | End: 2023-12-11 | Stop reason: HOSPADM

## 2023-12-08 RX ORDER — KETOROLAC TROMETHAMINE 30 MG/ML
15 INJECTION, SOLUTION INTRAMUSCULAR; INTRAVENOUS EVERY 8 HOURS PRN
Status: DISPENSED | OUTPATIENT
Start: 2023-12-08 | End: 2023-12-11

## 2023-12-08 RX ORDER — SODIUM CHLORIDE 0.9 % (FLUSH) 0.9 %
10 SYRINGE (ML) INJECTION
Status: DISCONTINUED | OUTPATIENT
Start: 2023-12-08 | End: 2023-12-11 | Stop reason: HOSPADM

## 2023-12-08 RX ORDER — ONDANSETRON 2 MG/ML
4 INJECTION INTRAMUSCULAR; INTRAVENOUS EVERY 8 HOURS PRN
Status: DISCONTINUED | OUTPATIENT
Start: 2023-12-08 | End: 2023-12-11 | Stop reason: HOSPADM

## 2023-12-08 RX ORDER — FAMOTIDINE 10 MG/ML
20 INJECTION INTRAVENOUS EVERY 12 HOURS
Status: DISCONTINUED | OUTPATIENT
Start: 2023-12-08 | End: 2023-12-11 | Stop reason: HOSPADM

## 2023-12-08 RX ADMIN — KETOROLAC TROMETHAMINE 15 MG: 30 INJECTION, SOLUTION INTRAMUSCULAR; INTRAVENOUS at 07:12

## 2023-12-08 RX ADMIN — FAMOTIDINE 20 MG: 10 INJECTION, SOLUTION INTRAVENOUS at 08:12

## 2023-12-08 RX ADMIN — ONDANSETRON 4 MG: 2 INJECTION INTRAMUSCULAR; INTRAVENOUS at 10:12

## 2023-12-08 RX ADMIN — SODIUM CHLORIDE: 9 INJECTION, SOLUTION INTRAVENOUS at 07:12

## 2023-12-08 RX ADMIN — POLYETHYLENE GLYCOL 3350 17 G: 17 POWDER, FOR SOLUTION ORAL at 03:12

## 2023-12-08 RX ADMIN — OXYCODONE HYDROCHLORIDE AND ACETAMINOPHEN 1 TABLET: 5; 325 TABLET ORAL at 08:12

## 2023-12-08 RX ADMIN — CYCLOBENZAPRINE HYDROCHLORIDE 10 MG: 5 TABLET, FILM COATED ORAL at 07:12

## 2023-12-08 RX ADMIN — BE HEALTH MAGNESIUM CITRATE ORAL SOLUTION - LEMON 296 ML: 1.75 LIQUID ORAL at 10:12

## 2023-12-08 RX ADMIN — HYDROMORPHONE HYDROCHLORIDE 1 MG: 2 INJECTION INTRAMUSCULAR; INTRAVENOUS; SUBCUTANEOUS at 01:12

## 2023-12-08 RX ADMIN — MORPHINE SULFATE 2 MG: 4 INJECTION INTRAVENOUS at 11:12

## 2023-12-08 RX ADMIN — KETOROLAC TROMETHAMINE 15 MG: 30 INJECTION, SOLUTION INTRAMUSCULAR; INTRAVENOUS at 05:12

## 2023-12-08 RX ADMIN — ONDANSETRON 4 MG: 2 INJECTION INTRAMUSCULAR; INTRAVENOUS at 12:12

## 2023-12-08 RX ADMIN — MORPHINE SULFATE 4 MG: 4 INJECTION INTRAVENOUS at 12:12

## 2023-12-08 RX ADMIN — MORPHINE SULFATE 2 MG: 4 INJECTION INTRAVENOUS at 02:12

## 2023-12-08 RX ADMIN — PROCHLORPERAZINE EDISYLATE 5 MG: 5 INJECTION INTRAMUSCULAR; INTRAVENOUS at 02:12

## 2023-12-08 RX ADMIN — KETOROLAC TROMETHAMINE 15 MG: 30 INJECTION, SOLUTION INTRAMUSCULAR; INTRAVENOUS at 11:12

## 2023-12-08 RX ADMIN — OXYCODONE HYDROCHLORIDE AND ACETAMINOPHEN 1 TABLET: 5; 325 TABLET ORAL at 12:12

## 2023-12-08 RX ADMIN — FAMOTIDINE 20 MG: 10 INJECTION, SOLUTION INTRAVENOUS at 07:12

## 2023-12-08 RX ADMIN — Medication 6 MG: at 11:12

## 2023-12-08 NOTE — SUBJECTIVE & OBJECTIVE
No current facility-administered medications on file prior to encounter.     Current Outpatient Medications on File Prior to Encounter   Medication Sig    buprenorphine-naloxone 8-2 mg (SUBOXONE) 8-2 mg Place 0.25 Film under the tongue 3 (three) times daily.    cloNIDine (CATAPRES) 0.2 MG tablet Take 0.2 mg by mouth every evening.    crisaborole (EUCRISA) 2 % Oint Apply 1 application  topically 2 (two) times daily.    cyclobenzaprine (FLEXERIL) 10 MG tablet Take 1 tablet (10 mg total) by mouth 3 (three) times daily as needed for Muscle spasms (pain).    ergocalciferol (ERGOCALCIFEROL) 50,000 unit Cap Take 1 capsule (50,000 Units total) by mouth every 7 days.    famotidine (PEPCID) 20 MG tablet Take 1 tablet by mouth 2 (two) times daily.    folic acid (FOLVITE) 1 MG tablet Take 2 tablets (2 mg total) by mouth once daily.    hydrOXYzine pamoate (VISTARIL) 50 MG Cap Take 100 mg by mouth 2 (two) times daily as needed.    levothyroxine (SYNTHROID, LEVOTHROID) 175 MCG tablet TAKE 1 tablet (175 mcg total) by mouth before breakfast for thyroid    methotrexate 2.5 MG Tab Take 6 tablets (15 mg total) by mouth every 7 days.    mirtazapine (REMERON) 15 MG tablet Take 15 mg by mouth every evening.    ondansetron (ZOFRAN-ODT) 8 MG TbDL Take 1 tablet (8 mg total) by mouth every 6 (six) hours as needed (nausea/vomiting).    oxyCODONE-acetaminophen (PERCOCET) 5-325 mg per tablet Take 1 tablet by mouth every 24 hours as needed for Pain.    polyethylene glycol (MIRALAX) 17 gram/dose powder Take 17 g by mouth once daily.    sumatriptan (IMITREX) 100 MG tablet TAKE 1 TABLET AS SINGLE DOSE, MAY REPEAT 1 DOSE IN 2 HRS IF SYMPTOMS PERSIST/RETURN, MAX DOSE IS 100MG/DOSE, 200MG PER 24 HRS    venlafaxine (EFFEXOR-XR) 75 MG 24 hr capsule Take 225 mg by mouth once daily.    [DISCONTINUED] gabapentin (NEURONTIN) 300 MG capsule Take 600 mg by mouth 3 (three) times daily.    clotrimazole (LOTRIMIN) 1 % cream Apply topically 2 (two) times daily.     "ketoconazole (NIZORAL) 2 % cream Apply topically.    nystatin (MYCOSTATIN) powder Apply 10 g topically 2 (two) times daily.    syringe with needle, safety (EASY TOUCH FLIPLOCK SYRINGE) 3 mL 23 gauge x 1 1/2" Syrg To use with methotrexate injections    triamcinolone acetonide 0.1% (KENALOG) 0.1 % cream Apply topically.    [DISCONTINUED] albuterol (VENTOLIN HFA) 90 mcg/actuation inhaler INJECT 1 ML SUB-Q ONCE MONTHLY AS DIRECTED    [DISCONTINUED] albuterol (VENTOLIN HFA) 90 mcg/actuation inhaler     [DISCONTINUED] albuterol (VENTOLIN HFA) 90 mcg/actuation inhaler APPLY A SMALL AMOUNT TO AFFECTED AREA(S) THREE TIMES DAILY    [DISCONTINUED] albuterol (VENTOLIN HFA) 90 mcg/actuation inhaler Take 1 tablet by mouth 2 (two) times daily.    [DISCONTINUED] celecoxib (CELEBREX) 200 MG capsule Take 1 capsule (200 mg total) by mouth 2 (two) times daily.    [DISCONTINUED] cephALEXin (KEFLEX) 250 MG capsule TAKE ONE CAPSULE BY MOUTH EVERY 6 HOURS    [DISCONTINUED] clobetasoL (TEMOVATE) 0.05 % external solution APPLY TO AFFECTED SCALP AREA TWICE DAILY IN THE MORNING AND IN THE EVENING    [DISCONTINUED] cloNIDine 0.2 mg/24 hr td ptwk (CATAPRES) 0.2 mg/24 hr     [DISCONTINUED] clotrimazole-betamethasone 1-0.05% (LOTRISONE) cream APPLY TO AFFECTED AREA & surrounding areas TWICE DAILY once IN morning AND once IN evening until clear then one additional week    [DISCONTINUED] diclofenac (VOLTAREN) 75 MG EC tablet Take 1 tablet by mouth 2 (two) times daily.    [DISCONTINUED] DULoxetine (CYMBALTA) 60 MG capsule TAKE ONE CAPSULE BY MOUTH EVERY MORNING (REPLACES FLUOXETINE)    [DISCONTINUED] ergocalciferol (ERGOCALCIFEROL) 50,000 unit Cap Take 1 capsule by mouth every 7 days.    [DISCONTINUED] fluconazole (DIFLUCAN) 150 MG Tab Take 1 tablet (150 mg total) by mouth once a week.    [DISCONTINUED] hydrOXYzine (ATARAX) 50 MG tablet hydroxyzine HCl 50 mg tablet   TAKE ONE TABLET BY MOUTH THREE TIMES DAILY    [DISCONTINUED] hydrOXYzine (ATARAX) 50 " MG tablet Take 1 tablet by mouth 3 (three) times daily.    [DISCONTINUED] levothyroxine (SYNTHROID) 150 MCG tablet TAKE ONE TABLET BY MOUTH EVERY DAY NEED TO REPEAT LABS    [DISCONTINUED] methotrexate 25 mg/mL injection Inject 1 mL (25 mg total) into the skin every 7 days.    [DISCONTINUED] nabumetone (RELAFEN) 750 MG tablet Take 1 tablet (750 mg total) by mouth 2 (two) times daily.    [DISCONTINUED] naproxen (NAPROSYN) 500 MG tablet Take 1 tablet (500 mg total) by mouth 2 (two) times daily as needed (headaches (take with meals, do not take with ibuprofen)).    [DISCONTINUED] pantoprazole (PROTONIX) 40 MG tablet TAKE ONE TABLET BY MOUTH EVERY DAY 30 MINUTES BEFORE BREAKFAST    [DISCONTINUED] pregabalin (LYRICA) 200 MG Cap Take 1 capsule by mouth 3 (three) times daily.    [DISCONTINUED] promethazine (PHENERGAN) 25 MG tablet TAKE ONE TABLET BY MOUTH EVERY 6-8 HOURS FOR 3 DAYS    [DISCONTINUED] pulse oximeter (PULSE OXIMETER) device by Apply Externally route 2 (two) times a day. Use twice daily at 8 AM and 3 PM and [facility] the [facility] as directed.    [DISCONTINUED] sulfamethoxazole-trimethoprim 400-80mg (BACTRIM,SEPTRA) 400-80 mg per tablet TAKE TWO TABLETS BY MOUTH EVERY TWELVE HOURS FOR 7 DAYS    [DISCONTINUED] traZODone (DESYREL) 100 MG tablet TAKE ONE TABLET BY MOUTH EVERY EVENING AT BEDTIME    [DISCONTINUED] valACYclovir (VALTREX) 1000 MG tablet Take 1,000 mg by mouth 2 (two) times daily.    [DISCONTINUED] venlafaxine (EFFEXOR-XR) 150 MG Cp24 Take 150 mg by mouth.       Review of patient's allergies indicates:   Allergen Reactions    Penicillins Anaphylaxis    Suman Itching     Patient states she is allergic to suman wipes    Hydrocortisone Rash       No past medical history on file.  Past Surgical History:   Procedure Laterality Date     SECTION      HYSTERECTOMY      TONSILLECTOMY      TUMOR REMOVAL      fibroid     Family History    None       Tobacco Use    Smoking status: Never    Smokeless  tobacco: Not on file   Substance and Sexual Activity    Alcohol use: Not on file    Drug use: Not on file    Sexual activity: Not on file     Review of Systems   Constitutional:  Positive for activity change and appetite change. Negative for chills, fatigue, fever and unexpected weight change.   HENT:  Negative for congestion, ear pain, sore throat and trouble swallowing.    Eyes:  Negative for pain, redness and itching.   Respiratory:  Negative for cough, shortness of breath and wheezing.    Cardiovascular:  Negative for chest pain, palpitations and leg swelling.   Gastrointestinal:  Positive for abdominal distention, abdominal pain, constipation, nausea and vomiting. Negative for anal bleeding, blood in stool, diarrhea and rectal pain.   Endocrine: Negative for cold intolerance, heat intolerance and polyuria.   Genitourinary:  Negative for dysuria, flank pain, frequency and hematuria.   Musculoskeletal:  Negative for gait problem, joint swelling and neck pain.   Skin:  Negative for color change, rash and wound.   Allergic/Immunologic: Negative for environmental allergies and immunocompromised state.   Neurological:  Negative for dizziness, speech difficulty, weakness and numbness.   Psychiatric/Behavioral:  Negative for agitation, confusion and hallucinations.      Objective:     Vital Signs (Most Recent):  Temp: 98.3 °F (36.8 °C) (12/08/23 0800)  Pulse: 80 (12/08/23 1000)  Resp: 20 (12/08/23 1000)  BP: (!) 108/58 (12/08/23 1000)  SpO2: 100 % (12/08/23 1000) Vital Signs (24h Range):  Temp:  [98.3 °F (36.8 °C)-98.9 °F (37.2 °C)] 98.3 °F (36.8 °C)  Pulse:  [76-96] 80  Resp:  [17-22] 20  SpO2:  [95 %-100 %] 100 %  BP: (100-148)/(58-87) 108/58        There is no height or weight on file to calculate BMI.     Physical Exam  Vitals and nursing note reviewed.   Constitutional:       General: She is not in acute distress.     Appearance: Normal appearance. She is well-developed. She is not ill-appearing or  toxic-appearing.   HENT:      Head: Normocephalic and atraumatic.      Right Ear: External ear normal.      Left Ear: External ear normal.      Nose: Nose normal.   Cardiovascular:      Rate and Rhythm: Normal rate.   Pulmonary:      Effort: Pulmonary effort is normal. No respiratory distress.   Abdominal:      Comments: Soft, mildly distended and tympanic. +TTP to LLQ. No rebound or guarding. Soft, easily reducible umbilical hernia.   Musculoskeletal:         General: Normal range of motion.      Cervical back: Normal range of motion and neck supple.   Skin:     General: Skin is warm and dry.   Neurological:      Mental Status: She is alert and oriented to person, place, and time.   Psychiatric:         Mood and Affect: Mood normal.         Behavior: Behavior normal.            I have reviewed all pertinent lab results within the past 24 hours.  CBC:   Recent Labs   Lab 12/07/23 2123   WBC 12.64   RBC 4.42   HGB 13.3   HCT 39.2   *   MCV 89   MCH 30.1   MCHC 33.9     CMP:   Recent Labs   Lab 12/07/23 2123   *   CALCIUM 9.3   ALBUMIN 3.3*   PROT 8.0      K 3.6   CO2 24      BUN 9   CREATININE 0.7   ALKPHOS 151*   ALT 11   AST 13   BILITOT 0.3     Component Ref Range & Units 1 d ago   Lipase 4 - 60 U/L 3 Low        Significant Diagnostics:  I have reviewed all pertinent imaging results/findings within the past 24 hours.    CT A/P FINDINGS:  Heart: Normal in size. No pericardial effusion.     Lung Bases: Few subcentimeter nodular and ground-glass opacities in the lung bases.     Liver: Hepatomegaly with fatty infiltration of the liver.  With no focal hepatic lesions.     Gallbladder: No calcified gallstones.     Bile Ducts: No evidence of dilated ducts.     Pancreas: No mass or peripancreatic fat stranding.     Spleen: Unremarkable.     Adrenals: Unremarkable.     Kidneys/ Ureters: Punctate nonobstructing renal calculi.     Bladder: No evidence of wall thickening.     Reproductive organs:  Status post hysterectomy     GI Tract/Mesentery: Dilated small bowel loops consistent with partial small bowel     Peritoneal Space: Mild mesenteric edema.     Retroperitoneum: No significant adenopathy.     Abdominal wall: Moderate fat containing umbilical hernia with mild adjacent fat stranding.     Vasculature: No  aneurysm.     Bones: No acute fracture.     Impression:     Moderate small bowel obstruction.     Few subcentimeter nodular and mild ground-glass opacities in the lung bases.  Correlate clinically for infectious inflammatory process.     Moderate fat containing umbilical hernia with mild adjacent fat stranding     Hepatomegaly with fatty infiltration of liver     Punctate nonobstructing renal calculi.  No evidence for bowel obstruction.

## 2023-12-08 NOTE — ASSESSMENT & PLAN NOTE
48 y.o. female with new onset abdominal pain worse to LLQ, n/v, constipation     -long discussion with patient. CT scan reviewed and more consistent with constipation - small bowel mildly/diffusely dilated with no transition point or decompressed areas. Patient no longer vomiting. Recommend repeat enema and po mag citrate. Since she did not improve with enema overnight, will admit to surgery and monitor. Once improved abdominal pain, having bowel function, tolerating diet, recommend discharge with bowel regimen of miralax BID. Will continue to monitor.   -PRN pain meds and antiemetics   -Discussed with patient that if she continues vomiting, will need NG tube placed

## 2023-12-08 NOTE — PLAN OF CARE
Discussed poc with pt, pt verbalized understanding  Purposeful rounding every 2hours  VS wnl  Fall precautions in place, remains injury free  Pain and nausea under control with PRN meds  IVFs  Accurate I&Os  Abx given as prescribed  Bed locked at lowest position  Call light within reach  Chart check complete  Will cont with POC    Problem: Adult Inpatient Plan of Care  Goal: Plan of Care Review  Outcome: Ongoing, Progressing  Goal: Patient-Specific Goal (Individualized)  Outcome: Ongoing, Progressing  Goal: Absence of Hospital-Acquired Illness or Injury  Outcome: Ongoing, Progressing  Goal: Optimal Comfort and Wellbeing  Outcome: Ongoing, Progressing  Goal: Readiness for Transition of Care  Outcome: Ongoing, Progressing
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
## 2023-12-08 NOTE — PHARMACY MED REC
"Admission Medication History     The home medication history was taken by Felix Corcoran.    You may go to "Admission" then "Reconcile Home Medications" tabs to review and/or act upon these items.     The home medication list has been updated by the Pharmacy department.   Please read ALL comments highlighted in yellow.   Please address this information as you see fit.    Feel free to contact us if you have any questions or require assistance.      The medications listed below were removed from the home medication list. Please reorder if appropriate:  Patient reports no longer taking the following medication(s):  CELEBREX 200MG  SYNTHROID 175MCG  RELAFEN 750MG  VALTREX 1000MG  PROTONIX 40MG    Medications listed below were obtained from: Patient/family and Analytic software- [employer]  (Not in a hospital admission)        Felix Corcoran  [id number]    Current Outpatient Medications on File Prior to Encounter   Medication Sig Dispense Refill Last Dose    buprenorphine-naloxone 8-2 mg (SUBOXONE) 8-2 mg Place 0.25 Film under the tongue 3 (three) times daily.   Past Week    cloNIDine (CATAPRES) 0.2 MG tablet Take 0.2 mg by mouth every evening.   Past Week    crisaborole (EUCRISA) 2 % Oint Apply 1 application  topically 2 (two) times daily.       cyclobenzaprine (FLEXERIL) 10 MG tablet Take 1 tablet (10 mg total) by mouth 3 (three) times daily as needed for Muscle spasms (pain). 30 tablet 3 Past Week    ergocalciferol (ERGOCALCIFEROL) 50,000 unit Cap Take 1 capsule (50,000 Units total) by mouth every 7 days. 12 capsule 0 Past Week    famotidine (PEPCID) 20 MG tablet Take 1 tablet by mouth 2 (two) times daily.   Past Week    folic acid (FOLVITE) 1 MG tablet Take 2 tablets (2 mg total) by mouth once daily. 60 tablet 4 Past Week    hydrOXYzine pamoate (VISTARIL) 50 MG Cap Take 100 mg by mouth 2 (two) times daily as needed.   Past Week    levothyroxine (SYNTHROID, LEVOTHROID) 175 MCG tablet TAKE 1 tablet (175 mcg total) " "by mouth before breakfast for thyroid 70 tablet 3 Past Week    methotrexate 2.5 MG Tab Take 6 tablets (15 mg total) by mouth every 7 days. 24 tablet 3 Past Week    mirtazapine (REMERON) 15 MG tablet Take 15 mg by mouth every evening.   Past Week    ondansetron (ZOFRAN-ODT) 8 MG TbDL Take 1 tablet (8 mg total) by mouth every 6 (six) hours as needed (nausea/vomiting). 30 tablet 2 Past Week    oxyCODONE-acetaminophen (PERCOCET) 5-325 mg per tablet Take 1 tablet by mouth every 24 hours as needed for Pain. 30 tablet 0 Past Week    polyethylene glycol (MIRALAX) 17 gram/dose powder Take 17 g by mouth once daily. 238 g 2 Past Week    sumatriptan (IMITREX) 100 MG tablet TAKE 1 TABLET AS SINGLE DOSE, MAY REPEAT 1 DOSE IN 2 HRS IF SYMPTOMS PERSIST/RETURN, MAX DOSE IS 100MG/DOSE, 200MG PER 24 HRS 18 tablet 2 Past Week    venlafaxine (EFFEXOR-XR) 75 MG 24 hr capsule Take 225 mg by mouth once daily.   Past Week    [DISCONTINUED] gabapentin (NEURONTIN) 300 MG capsule Take 600 mg by mouth 3 (three) times daily.       clotrimazole (LOTRIMIN) 1 % cream Apply topically 2 (two) times daily. 85 g 2     ketoconazole (NIZORAL) 2 % cream Apply topically.       nystatin (MYCOSTATIN) powder Apply 10 g topically 2 (two) times daily. 60 g 3     syringe with needle, safety (EASY TOUCH FLIPLOCK SYRINGE) 3 mL 23 gauge x 1 1/2" Syrg To use with methotrexate injections 4 each 6     triamcinolone acetonide 0.1% (KENALOG) 0.1 % cream Apply topically.                          .          "

## 2023-12-08 NOTE — ED NOTES
Assisted patient onto bedside commode. Cleaned linen on patient bed and helped patient ambulate back into bed. Family at bedside.

## 2023-12-09 PROBLEM — E87.6 HYPOKALEMIA: Status: ACTIVE | Noted: 2023-12-09

## 2023-12-09 PROBLEM — E66.01 MORBID OBESITY: Status: ACTIVE | Noted: 2023-12-09

## 2023-12-09 PROBLEM — R07.89 CHEST TIGHTNESS: Chronic | Status: ACTIVE | Noted: 2023-12-09

## 2023-12-09 LAB
ALBUMIN SERPL BCP-MCNC: 2.6 G/DL (ref 3.5–5.2)
ALP SERPL-CCNC: 129 U/L (ref 55–135)
ALT SERPL W/O P-5'-P-CCNC: 8 U/L (ref 10–44)
ANION GAP SERPL CALC-SCNC: 14 MMOL/L (ref 8–16)
ANION GAP SERPL CALC-SCNC: 8 MMOL/L (ref 8–16)
AST SERPL-CCNC: 11 U/L (ref 10–40)
BASOPHILS # BLD AUTO: 0.04 K/UL (ref 0–0.2)
BASOPHILS # BLD AUTO: 0.06 K/UL (ref 0–0.2)
BASOPHILS NFR BLD: 0.4 % (ref 0–1.9)
BASOPHILS NFR BLD: 0.5 % (ref 0–1.9)
BILIRUB SERPL-MCNC: 0.6 MG/DL (ref 0.1–1)
BUN SERPL-MCNC: 11 MG/DL (ref 6–20)
BUN SERPL-MCNC: 14 MG/DL (ref 6–20)
CALCIUM SERPL-MCNC: 8.1 MG/DL (ref 8.7–10.5)
CALCIUM SERPL-MCNC: 8.4 MG/DL (ref 8.7–10.5)
CHLORIDE SERPL-SCNC: 108 MMOL/L (ref 95–110)
CHLORIDE SERPL-SCNC: 109 MMOL/L (ref 95–110)
CO2 SERPL-SCNC: 19 MMOL/L (ref 23–29)
CO2 SERPL-SCNC: 21 MMOL/L (ref 23–29)
CREAT SERPL-MCNC: 0.6 MG/DL (ref 0.5–1.4)
CREAT SERPL-MCNC: 0.6 MG/DL (ref 0.5–1.4)
DIFFERENTIAL METHOD: ABNORMAL
DIFFERENTIAL METHOD: ABNORMAL
EOSINOPHIL # BLD AUTO: 0 K/UL (ref 0–0.5)
EOSINOPHIL # BLD AUTO: 0.1 K/UL (ref 0–0.5)
EOSINOPHIL NFR BLD: 0.3 % (ref 0–8)
EOSINOPHIL NFR BLD: 0.3 % (ref 0–8)
ERYTHROCYTE [DISTWIDTH] IN BLOOD BY AUTOMATED COUNT: 15.3 % (ref 11.5–14.5)
ERYTHROCYTE [DISTWIDTH] IN BLOOD BY AUTOMATED COUNT: 15.6 % (ref 11.5–14.5)
EST. GFR  (NO RACE VARIABLE): >60 ML/MIN/1.73 M^2
EST. GFR  (NO RACE VARIABLE): >60 ML/MIN/1.73 M^2
GLUCOSE SERPL-MCNC: 124 MG/DL (ref 70–110)
GLUCOSE SERPL-MCNC: 93 MG/DL (ref 70–110)
HCT VFR BLD AUTO: 37.5 % (ref 37–48.5)
HCT VFR BLD AUTO: 39.2 % (ref 37–48.5)
HGB BLD-MCNC: 12.2 G/DL (ref 12–16)
HGB BLD-MCNC: 12.6 G/DL (ref 12–16)
IMM GRANULOCYTES # BLD AUTO: 0.03 K/UL (ref 0–0.04)
IMM GRANULOCYTES # BLD AUTO: 0.08 K/UL (ref 0–0.04)
IMM GRANULOCYTES NFR BLD AUTO: 0.3 % (ref 0–0.5)
IMM GRANULOCYTES NFR BLD AUTO: 0.5 % (ref 0–0.5)
LACTATE SERPL-SCNC: 1.5 MMOL/L (ref 0.5–2.2)
LYMPHOCYTES # BLD AUTO: 0.5 K/UL (ref 1–4.8)
LYMPHOCYTES # BLD AUTO: 0.9 K/UL (ref 1–4.8)
LYMPHOCYTES NFR BLD: 5.1 % (ref 18–48)
LYMPHOCYTES NFR BLD: 5.5 % (ref 18–48)
MCH RBC QN AUTO: 30.1 PG (ref 27–31)
MCH RBC QN AUTO: 30.8 PG (ref 27–31)
MCHC RBC AUTO-ENTMCNC: 32.1 G/DL (ref 32–36)
MCHC RBC AUTO-ENTMCNC: 32.5 G/DL (ref 32–36)
MCV RBC AUTO: 94 FL (ref 82–98)
MCV RBC AUTO: 95 FL (ref 82–98)
MONOCYTES # BLD AUTO: 0 K/UL (ref 0.3–1)
MONOCYTES # BLD AUTO: 0.5 K/UL (ref 0.3–1)
MONOCYTES NFR BLD: 0.2 % (ref 4–15)
MONOCYTES NFR BLD: 3.3 % (ref 4–15)
NEUTROPHILS # BLD AUTO: 14.6 K/UL (ref 1.8–7.7)
NEUTROPHILS # BLD AUTO: 8.3 K/UL (ref 1.8–7.7)
NEUTROPHILS NFR BLD: 90 % (ref 38–73)
NEUTROPHILS NFR BLD: 93.6 % (ref 38–73)
NRBC BLD-RTO: 0 /100 WBC
NRBC BLD-RTO: 0 /100 WBC
PLATELET # BLD AUTO: 301 K/UL (ref 150–450)
PLATELET # BLD AUTO: 353 K/UL (ref 150–450)
PMV BLD AUTO: 9.2 FL (ref 9.2–12.9)
PMV BLD AUTO: 9.7 FL (ref 9.2–12.9)
POTASSIUM SERPL-SCNC: 2.8 MMOL/L (ref 3.5–5.1)
POTASSIUM SERPL-SCNC: 3.6 MMOL/L (ref 3.5–5.1)
PROT SERPL-MCNC: 6.6 G/DL (ref 6–8.4)
RBC # BLD AUTO: 3.96 M/UL (ref 4–5.4)
RBC # BLD AUTO: 4.18 M/UL (ref 4–5.4)
SODIUM SERPL-SCNC: 138 MMOL/L (ref 136–145)
SODIUM SERPL-SCNC: 141 MMOL/L (ref 136–145)
TROPONIN I SERPL DL<=0.01 NG/ML-MCNC: <0.006 NG/ML (ref 0–0.03)
WBC # BLD AUTO: 16.2 K/UL (ref 3.9–12.7)
WBC # BLD AUTO: 8.86 K/UL (ref 3.9–12.7)

## 2023-12-09 PROCEDURE — 80053 COMPREHEN METABOLIC PANEL: CPT | Performed by: EMERGENCY MEDICINE

## 2023-12-09 PROCEDURE — 93005 ELECTROCARDIOGRAM TRACING: CPT

## 2023-12-09 PROCEDURE — 93010 ELECTROCARDIOGRAM REPORT: CPT | Mod: ,,, | Performed by: STUDENT IN AN ORGANIZED HEALTH CARE EDUCATION/TRAINING PROGRAM

## 2023-12-09 PROCEDURE — 36415 COLL VENOUS BLD VENIPUNCTURE: CPT | Performed by: SURGERY

## 2023-12-09 PROCEDURE — 63600175 PHARM REV CODE 636 W HCPCS: Performed by: SURGERY

## 2023-12-09 PROCEDURE — G0378 HOSPITAL OBSERVATION PER HR: HCPCS

## 2023-12-09 PROCEDURE — 96372 THER/PROPH/DIAG INJ SC/IM: CPT | Performed by: SURGERY

## 2023-12-09 PROCEDURE — 99233 PR SUBSEQUENT HOSPITAL CARE,LEVL III: ICD-10-PCS | Mod: ,,, | Performed by: SURGERY

## 2023-12-09 PROCEDURE — 25000003 PHARM REV CODE 250

## 2023-12-09 PROCEDURE — 85025 COMPLETE CBC W/AUTO DIFF WBC: CPT | Performed by: EMERGENCY MEDICINE

## 2023-12-09 PROCEDURE — 85025 COMPLETE CBC W/AUTO DIFF WBC: CPT | Mod: 91 | Performed by: SURGERY

## 2023-12-09 PROCEDURE — 25000003 PHARM REV CODE 250: Performed by: SURGERY

## 2023-12-09 PROCEDURE — 83605 ASSAY OF LACTIC ACID: CPT | Performed by: SURGERY

## 2023-12-09 PROCEDURE — 93010 EKG 12-LEAD: ICD-10-PCS | Mod: ,,, | Performed by: STUDENT IN AN ORGANIZED HEALTH CARE EDUCATION/TRAINING PROGRAM

## 2023-12-09 PROCEDURE — 84484 ASSAY OF TROPONIN QUANT: CPT | Performed by: SURGERY

## 2023-12-09 PROCEDURE — 63600175 PHARM REV CODE 636 W HCPCS: Performed by: STUDENT IN AN ORGANIZED HEALTH CARE EDUCATION/TRAINING PROGRAM

## 2023-12-09 PROCEDURE — 11000001 HC ACUTE MED/SURG PRIVATE ROOM

## 2023-12-09 PROCEDURE — 99233 SBSQ HOSP IP/OBS HIGH 50: CPT | Mod: ,,, | Performed by: SURGERY

## 2023-12-09 PROCEDURE — 99900035 HC TECH TIME PER 15 MIN (STAT)

## 2023-12-09 PROCEDURE — 80048 BASIC METABOLIC PNL TOTAL CA: CPT | Mod: XB | Performed by: SURGERY

## 2023-12-09 PROCEDURE — 25000003 PHARM REV CODE 250: Performed by: EMERGENCY MEDICINE

## 2023-12-09 PROCEDURE — 63600175 PHARM REV CODE 636 W HCPCS: Performed by: EMERGENCY MEDICINE

## 2023-12-09 PROCEDURE — 94761 N-INVAS EAR/PLS OXIMETRY MLT: CPT

## 2023-12-09 PROCEDURE — 36415 COLL VENOUS BLD VENIPUNCTURE: CPT | Performed by: EMERGENCY MEDICINE

## 2023-12-09 PROCEDURE — 27000221 HC OXYGEN, UP TO 24 HOURS

## 2023-12-09 RX ORDER — MORPHINE SULFATE 4 MG/ML
2 INJECTION, SOLUTION INTRAMUSCULAR; INTRAVENOUS EVERY 4 HOURS PRN
Status: DISCONTINUED | OUTPATIENT
Start: 2023-12-09 | End: 2023-12-11 | Stop reason: HOSPADM

## 2023-12-09 RX ORDER — CLONIDINE HYDROCHLORIDE 0.2 MG/1
0.2 TABLET ORAL NIGHTLY
Status: DISCONTINUED | OUTPATIENT
Start: 2023-12-09 | End: 2023-12-11 | Stop reason: HOSPADM

## 2023-12-09 RX ORDER — OXYCODONE AND ACETAMINOPHEN 5; 325 MG/1; MG/1
1 TABLET ORAL EVERY 4 HOURS PRN
Status: DISCONTINUED | OUTPATIENT
Start: 2023-12-10 | End: 2023-12-11 | Stop reason: HOSPADM

## 2023-12-09 RX ORDER — ENOXAPARIN SODIUM 100 MG/ML
40 INJECTION SUBCUTANEOUS EVERY 24 HOURS
Status: DISCONTINUED | OUTPATIENT
Start: 2023-12-09 | End: 2023-12-11 | Stop reason: HOSPADM

## 2023-12-09 RX ORDER — POTASSIUM CHLORIDE 20 MEQ/1
60 TABLET, EXTENDED RELEASE ORAL ONCE
Status: COMPLETED | OUTPATIENT
Start: 2023-12-09 | End: 2023-12-09

## 2023-12-09 RX ORDER — SIMETHICONE 80 MG
1 TABLET,CHEWABLE ORAL 3 TIMES DAILY PRN
Status: DISCONTINUED | OUTPATIENT
Start: 2023-12-09 | End: 2023-12-11 | Stop reason: HOSPADM

## 2023-12-09 RX ORDER — OXYCODONE AND ACETAMINOPHEN 10; 325 MG/1; MG/1
1 TABLET ORAL EVERY 4 HOURS PRN
Status: DISCONTINUED | OUTPATIENT
Start: 2023-12-09 | End: 2023-12-11 | Stop reason: HOSPADM

## 2023-12-09 RX ADMIN — POTASSIUM CHLORIDE 60 MEQ: 1500 TABLET, EXTENDED RELEASE ORAL at 11:12

## 2023-12-09 RX ADMIN — FAMOTIDINE 20 MG: 10 INJECTION, SOLUTION INTRAVENOUS at 08:12

## 2023-12-09 RX ADMIN — OXYCODONE HYDROCHLORIDE AND ACETAMINOPHEN 1 TABLET: 5; 325 TABLET ORAL at 08:12

## 2023-12-09 RX ADMIN — MORPHINE SULFATE 2 MG: 4 INJECTION INTRAVENOUS at 03:12

## 2023-12-09 RX ADMIN — LEVOTHYROXINE SODIUM 175 MCG: 150 TABLET ORAL at 11:12

## 2023-12-09 RX ADMIN — KETOROLAC TROMETHAMINE 15 MG: 30 INJECTION, SOLUTION INTRAMUSCULAR; INTRAVENOUS at 11:12

## 2023-12-09 RX ADMIN — MORPHINE SULFATE 2 MG: 4 INJECTION INTRAVENOUS at 10:12

## 2023-12-09 RX ADMIN — ONDANSETRON 4 MG: 2 INJECTION INTRAMUSCULAR; INTRAVENOUS at 02:12

## 2023-12-09 RX ADMIN — KETOROLAC TROMETHAMINE 15 MG: 30 INJECTION, SOLUTION INTRAMUSCULAR; INTRAVENOUS at 05:12

## 2023-12-09 RX ADMIN — FAMOTIDINE 20 MG: 10 INJECTION, SOLUTION INTRAVENOUS at 10:12

## 2023-12-09 RX ADMIN — OXYCODONE HYDROCHLORIDE AND ACETAMINOPHEN 1 TABLET: 5; 325 TABLET ORAL at 02:12

## 2023-12-09 RX ADMIN — ENOXAPARIN SODIUM 40 MG: 40 INJECTION SUBCUTANEOUS at 05:12

## 2023-12-09 RX ADMIN — CLONIDINE HYDROCHLORIDE 0.2 MG: 0.2 TABLET ORAL at 08:12

## 2023-12-09 RX ADMIN — SODIUM CHLORIDE: 9 INJECTION, SOLUTION INTRAVENOUS at 10:12

## 2023-12-09 RX ADMIN — VENLAFAXINE HYDROCHLORIDE 225 MG: 150 CAPSULE, EXTENDED RELEASE ORAL at 11:12

## 2023-12-09 RX ADMIN — POLYETHYLENE GLYCOL 3350 17 G: 17 POWDER, FOR SOLUTION ORAL at 08:12

## 2023-12-09 RX ADMIN — SIMETHICONE 80 MG: 80 TABLET, CHEWABLE ORAL at 03:12

## 2023-12-09 NOTE — HOSPITAL COURSE
Hospital day 1 passing small amount of flatus no bowel movement, abdominal bloating, chest tightness patient attributes to her recent rib fractures    Hospital day 2 passing flatus no bowel movement, patient complains of all over chronic pain, small-bowel follow-through today

## 2023-12-09 NOTE — SUBJECTIVE & OBJECTIVE
Interval History: passing small amount of flatus no bowel movement, abdominal bloating, no nausea or vomiting, chest tightness patient attributes to her recent rib fractures    Medications:  Continuous Infusions:   sodium chloride 0.9% 50 mL/hr at 12/08/23 0757     Scheduled Meds:   famotidine (PF)  20 mg Intravenous Q12H    ketorolac  15 mg Intravenous Q6H    polyethylene glycol  17 g Oral Daily     PRN Meds:cyclobenzaprine, ketorolac, melatonin, morphine, ondansetron, oxyCODONE-acetaminophen, prochlorperazine, sodium chloride 0.9%     Review of patient's allergies indicates:   Allergen Reactions    Penicillins Anaphylaxis    Suman Itching     Patient states she is allergic to suman wipes    Hydrocortisone Rash     Objective:     Vital Signs (Most Recent):  Temp: 98 °F (36.7 °C) (12/09/23 0741)  Pulse: 100 (12/09/23 0828)  Resp: 18 (12/09/23 1042)  BP: (!) 113/57 (12/09/23 0741)  SpO2: (!) 94 % (12/09/23 0828) Vital Signs (24h Range):  Temp:  [98 °F (36.7 °C)-99 °F (37.2 °C)] 98 °F (36.7 °C)  Pulse:  [] 100  Resp:  [16-19] 18  SpO2:  [91 %-99 %] 94 %  BP: (111-134)/(57-67) 113/57        There is no height or weight on file to calculate BMI.    Intake/Output - Last 3 Shifts         12/07 0700  12/08 0659 12/08 0700  12/09 0659 12/09 0700  12/10 0659    IV Piggyback 1500      Total Intake 1500      Net +1500             Stool Occurrence  2 x 3 x             Physical Exam  Vitals reviewed.   Constitutional:       Appearance: She is well-developed. She is obese.   HENT:      Head: Normocephalic and atraumatic.   Cardiovascular:      Rate and Rhythm: Normal rate and regular rhythm.   Pulmonary:      Effort: Pulmonary effort is normal.      Breath sounds: Normal breath sounds.   Abdominal:      General: There is distension (moderate).      Palpations: Abdomen is soft.      Tenderness: There is no abdominal tenderness.      Comments: Lower abdominal surgical scars   Musculoskeletal:      Cervical back: Neck supple.    Skin:     General: Skin is warm and dry.   Neurological:      Mental Status: She is alert and oriented to person, place, and time.          Significant Labs:  I have reviewed all pertinent lab results within the past 24 hours.  CBC:   Recent Labs   Lab 12/09/23  0617   WBC 16.20*   RBC 3.96*   HGB 12.2   HCT 37.5      MCV 95   MCH 30.8   MCHC 32.5     BMP:   Recent Labs   Lab 12/09/23  0617   *      K 2.8*      CO2 19*   BUN 11   CREATININE 0.6   CALCIUM 8.1*     Cardiac markers:   Recent Labs   Lab 12/09/23  0926   TROPONINI <0.006       Significant Diagnostics:  Abdominal x-ray:   Impression:     Abnormal study with several nonspecific dilated loops of small bowel in the left abdomen.  Nondilated air-filled large bowel.  Possible ileus or partial bowel obstruction.     Chest x-ray:   Impression:     No acute findings.

## 2023-12-09 NOTE — PLAN OF CARE
Patient in bed, Alert aware and orientated. Able to make all needs know. Safety and comfort remains in progress. Controled pain levels via physicians ordere. No complaints voiced will continue to monitor this patient,    pecific Goal (Individualized)  Outcome: Ongoing, Progressing  Flowsheets (Taken 12/9/2023 0113)  Anxieties, Fears or Concerns: pain control  Individualized Care Needs: pain control  Goal: Absence of Hospital-Acquired Illness or Injury  Outcome: Ongoing, Progressing  Goal: Optimal Comfort and Wellbeing  Outcome: Ongoing, Progressing  Goal: Readiness for Transition of Care  Outcome: Ongoing, Progressing     Problem: Adult Inpatient Plan of Care  Goal: Plan of Care Review  Outcome: Ongoing, Progressing  Goal: Patient-Specific Goal (Individualized)  Outcome: Ongoing, Progressing  Flowsheets (Taken 12/9/2023 0113)  Anxieties, Fears or Concerns: pain control  Individualized Care Needs: pain control  Goal: Absence of Hospital-Acquired Illness or Injury  Outcome: Ongoing, Progressing  Goal: Optimal Comfort and Wellbeing  Outcome: Ongoing, Progressing  Goal: Readiness for Transition of Care  Outcome: Ongoing, Progressing

## 2023-12-09 NOTE — PLAN OF CARE
Discussed poc with pt, pt verbalized understanding  Purposeful rounding every 2hours  VS wnl  Fall precautions in place, remains injury free  Pain under control with PRN meds  IVFs  Accurate I&Os  Abx given as prescribed  Bed locked at lowest position  Call light within reach  Chart check complete  Will cont with POC    Problem: Adult Inpatient Plan of Care  Goal: Plan of Care Review  Outcome: Ongoing, Progressing  Goal: Patient-Specific Goal (Individualized)  Outcome: Ongoing, Progressing  Goal: Absence of Hospital-Acquired Illness or Injury  Outcome: Ongoing, Progressing  Goal: Optimal Comfort and Wellbeing  Outcome: Ongoing, Progressing  Goal: Readiness for Transition of Care  Outcome: Ongoing, Progressing

## 2023-12-09 NOTE — ASSESSMENT & PLAN NOTE
Chest x-ray  Troponin  EKG  Likely musculoskeletal as patient has history of recent rib fractures and feels this is associated with it.  Typically controlled with pain medications
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
## 2023-12-09 NOTE — ASSESSMENT & PLAN NOTE
48 y.o. female with new onset abdominal pain worse to LLQ, n/v, constipation     - small appetite, remains bloated, minimal bowel function passing flatus but no bowel movement  - PRN pain meds and antiemetics   - out of bed to chair and ambulation  - DVT prophylaxis  - leukocytosis no fevers or signs of infection, monitor CBC if in used to elevate will check cultures and further imaging  - if no improvement in symptoms will plan for small-bowel follow-through  - Discussed with patient that if she continues vomiting, will need NG tube placed

## 2023-12-10 LAB
ANION GAP SERPL CALC-SCNC: 11 MMOL/L (ref 8–16)
BASOPHILS # BLD AUTO: 0.04 K/UL (ref 0–0.2)
BASOPHILS NFR BLD: 0.3 % (ref 0–1.9)
BUN SERPL-MCNC: 12 MG/DL (ref 6–20)
CALCIUM SERPL-MCNC: 8.3 MG/DL (ref 8.7–10.5)
CHLORIDE SERPL-SCNC: 109 MMOL/L (ref 95–110)
CO2 SERPL-SCNC: 20 MMOL/L (ref 23–29)
CREAT SERPL-MCNC: 0.6 MG/DL (ref 0.5–1.4)
DIFFERENTIAL METHOD: ABNORMAL
EOSINOPHIL # BLD AUTO: 0 K/UL (ref 0–0.5)
EOSINOPHIL NFR BLD: 0.1 % (ref 0–8)
ERYTHROCYTE [DISTWIDTH] IN BLOOD BY AUTOMATED COUNT: 15 % (ref 11.5–14.5)
EST. GFR  (NO RACE VARIABLE): >60 ML/MIN/1.73 M^2
GLUCOSE SERPL-MCNC: 111 MG/DL (ref 70–110)
HCT VFR BLD AUTO: 36.3 % (ref 37–48.5)
HGB BLD-MCNC: 12.2 G/DL (ref 12–16)
IMM GRANULOCYTES # BLD AUTO: 0.08 K/UL (ref 0–0.04)
IMM GRANULOCYTES NFR BLD AUTO: 0.6 % (ref 0–0.5)
LYMPHOCYTES # BLD AUTO: 0.8 K/UL (ref 1–4.8)
LYMPHOCYTES NFR BLD: 6.1 % (ref 18–48)
MCH RBC QN AUTO: 30.3 PG (ref 27–31)
MCHC RBC AUTO-ENTMCNC: 33.6 G/DL (ref 32–36)
MCV RBC AUTO: 90 FL (ref 82–98)
MONOCYTES # BLD AUTO: 0.4 K/UL (ref 0.3–1)
MONOCYTES NFR BLD: 3.2 % (ref 4–15)
NEUTROPHILS # BLD AUTO: 11.1 K/UL (ref 1.8–7.7)
NEUTROPHILS NFR BLD: 89.7 % (ref 38–73)
NRBC BLD-RTO: 0 /100 WBC
PLATELET # BLD AUTO: 341 K/UL (ref 150–450)
PMV BLD AUTO: 9.7 FL (ref 9.2–12.9)
POTASSIUM SERPL-SCNC: 3.4 MMOL/L (ref 3.5–5.1)
RBC # BLD AUTO: 4.02 M/UL (ref 4–5.4)
SODIUM SERPL-SCNC: 140 MMOL/L (ref 136–145)
WBC # BLD AUTO: 12.36 K/UL (ref 3.9–12.7)

## 2023-12-10 PROCEDURE — 63600175 PHARM REV CODE 636 W HCPCS: Performed by: SURGERY

## 2023-12-10 PROCEDURE — 80048 BASIC METABOLIC PNL TOTAL CA: CPT | Performed by: SURGERY

## 2023-12-10 PROCEDURE — 63600175 PHARM REV CODE 636 W HCPCS: Performed by: STUDENT IN AN ORGANIZED HEALTH CARE EDUCATION/TRAINING PROGRAM

## 2023-12-10 PROCEDURE — 25000003 PHARM REV CODE 250: Performed by: SURGERY

## 2023-12-10 PROCEDURE — 63600175 PHARM REV CODE 636 W HCPCS: Performed by: EMERGENCY MEDICINE

## 2023-12-10 PROCEDURE — 99233 SBSQ HOSP IP/OBS HIGH 50: CPT | Mod: ,,, | Performed by: SURGERY

## 2023-12-10 PROCEDURE — 36415 COLL VENOUS BLD VENIPUNCTURE: CPT | Performed by: SURGERY

## 2023-12-10 PROCEDURE — 99233 PR SUBSEQUENT HOSPITAL CARE,LEVL III: ICD-10-PCS | Mod: ,,, | Performed by: SURGERY

## 2023-12-10 PROCEDURE — 11000001 HC ACUTE MED/SURG PRIVATE ROOM

## 2023-12-10 PROCEDURE — 25500020 PHARM REV CODE 255: Performed by: STUDENT IN AN ORGANIZED HEALTH CARE EDUCATION/TRAINING PROGRAM

## 2023-12-10 PROCEDURE — 25000003 PHARM REV CODE 250

## 2023-12-10 PROCEDURE — 25000003 PHARM REV CODE 250: Performed by: EMERGENCY MEDICINE

## 2023-12-10 PROCEDURE — 85025 COMPLETE CBC W/AUTO DIFF WBC: CPT | Performed by: SURGERY

## 2023-12-10 RX ORDER — SYRING-NEEDL,DISP,INSUL,0.3 ML 29 G X1/2"
296 SYRINGE, EMPTY DISPOSABLE MISCELLANEOUS ONCE
Status: COMPLETED | OUTPATIENT
Start: 2023-12-10 | End: 2023-12-10

## 2023-12-10 RX ADMIN — ENOXAPARIN SODIUM 40 MG: 40 INJECTION SUBCUTANEOUS at 04:12

## 2023-12-10 RX ADMIN — KETOROLAC TROMETHAMINE 15 MG: 30 INJECTION, SOLUTION INTRAMUSCULAR; INTRAVENOUS at 06:12

## 2023-12-10 RX ADMIN — CLONIDINE HYDROCHLORIDE 0.2 MG: 0.2 TABLET ORAL at 08:12

## 2023-12-10 RX ADMIN — DIATRIZOATE MEGLUMINE AND DIATRIZOATE SODIUM 120 ML: 660; 100 LIQUID ORAL; RECTAL at 10:12

## 2023-12-10 RX ADMIN — LEVOTHYROXINE SODIUM 175 MCG: 150 TABLET ORAL at 09:12

## 2023-12-10 RX ADMIN — KETOROLAC TROMETHAMINE 15 MG: 30 INJECTION, SOLUTION INTRAMUSCULAR; INTRAVENOUS at 05:12

## 2023-12-10 RX ADMIN — VENLAFAXINE HYDROCHLORIDE 225 MG: 150 CAPSULE, EXTENDED RELEASE ORAL at 09:12

## 2023-12-10 RX ADMIN — POLYETHYLENE GLYCOL 3350 17 G: 17 POWDER, FOR SOLUTION ORAL at 09:12

## 2023-12-10 RX ADMIN — KETOROLAC TROMETHAMINE 15 MG: 30 INJECTION, SOLUTION INTRAMUSCULAR; INTRAVENOUS at 12:12

## 2023-12-10 RX ADMIN — OXYCODONE AND ACETAMINOPHEN 1 TABLET: 10; 325 TABLET ORAL at 04:12

## 2023-12-10 RX ADMIN — SODIUM CHLORIDE: 9 INJECTION, SOLUTION INTRAVENOUS at 04:12

## 2023-12-10 RX ADMIN — MORPHINE SULFATE 2 MG: 4 INJECTION INTRAVENOUS at 12:12

## 2023-12-10 RX ADMIN — BE HEALTH MAGNESIUM CITRATE ORAL SOLUTION - LEMON 296 ML: 1.75 LIQUID ORAL at 12:12

## 2023-12-10 RX ADMIN — FAMOTIDINE 20 MG: 10 INJECTION, SOLUTION INTRAVENOUS at 10:12

## 2023-12-10 RX ADMIN — PROCHLORPERAZINE EDISYLATE 5 MG: 5 INJECTION INTRAMUSCULAR; INTRAVENOUS at 09:12

## 2023-12-10 RX ADMIN — FAMOTIDINE 20 MG: 10 INJECTION, SOLUTION INTRAVENOUS at 09:12

## 2023-12-10 NOTE — PLAN OF CARE
Pt resting in bed. No distress . Pain controlled with scheduled Toradol . HOB elevated . Safety measures intact.  Chart check complete. Will continue to monitor .

## 2023-12-10 NOTE — ASSESSMENT & PLAN NOTE
48 y.o. female with new onset abdominal pain worse to LLQ, n/v, constipation     - small-bowel follow-through today  - PRN pain meds and antiemetics   - out of bed to chair and ambulation  - DVT prophylaxis  - patient with chronic pain on medications, discussed that this may be causing her constipation, if small-bowel follow-through negative for obstruction will attempt to wean pain and increased bowel regimen  - Discussed with patient that if she continues vomiting, will need NG tube placed
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
## 2023-12-10 NOTE — SUBJECTIVE & OBJECTIVE
Interval History: passing flatus no bowel movement, patient complains of all over chronic pain, small-bowel follow-through today    Medications:  Continuous Infusions:   sodium chloride 0.9% 50 mL/hr at 12/10/23 0548     Scheduled Meds:   cloNIDine  0.2 mg Oral QHS    enoxparin  40 mg Subcutaneous Q24H (prophylaxis, 1700)    famotidine (PF)  20 mg Intravenous Q12H    ketorolac  15 mg Intravenous Q6H    levothyroxine  175 mcg Oral Daily    magnesium citrate  296 mL Oral Once    venlafaxine  225 mg Oral Daily     PRN Meds:cyclobenzaprine, ketorolac, melatonin, morphine, ondansetron, oxyCODONE-acetaminophen, oxyCODONE-acetaminophen, prochlorperazine, simethicone, sodium chloride 0.9%     Review of patient's allergies indicates:   Allergen Reactions    Penicillins Anaphylaxis    Suman Itching     Patient states she is allergic to suman wipes    Hydrocortisone Rash     Objective:     Vital Signs (Most Recent):  Temp: 98.2 °F (36.8 °C) (12/10/23 0802)  Pulse: 87 (12/10/23 0802)  Resp: 17 (12/10/23 0802)  BP: (!) 100/56 (12/10/23 0802)  SpO2: (!) 94 % (12/10/23 0802) Vital Signs (24h Range):  Temp:  [98 °F (36.7 °C)-98.7 °F (37.1 °C)] 98.2 °F (36.8 °C)  Pulse:  [] 87  Resp:  [17-20] 17  SpO2:  [92 %-98 %] 94 %  BP: (100-130)/(56-70) 100/56     Weight: 90.3 kg (199 lb 1.2 oz) (per MIRELLA Pandya)  Body mass index is 33.13 kg/m².    Intake/Output - Last 3 Shifts         12/08 0700  12/09 0659 12/09 0700  12/10 0659 12/10 0700  12/11 0659    P.O.  370     I.V. (mL/kg)  607.2 (6.7)     IV Piggyback       Total Intake(mL/kg)  977.2 (10.8)     Net  +977.2            Urine Occurrence  4 x     Stool Occurrence 2 x 10 x              Physical Exam  Vitals reviewed.   Constitutional:       Appearance: She is well-developed. She is obese.   HENT:      Head: Normocephalic and atraumatic.   Cardiovascular:      Rate and Rhythm: Normal rate and regular rhythm.   Pulmonary:      Effort: Pulmonary effort is normal.      Breath  sounds: Normal breath sounds.   Abdominal:      General: There is distension (moderate).      Palpations: Abdomen is soft.      Tenderness: There is no abdominal tenderness.      Comments: Lower abdominal surgical scars   Musculoskeletal:      Cervical back: Neck supple.   Skin:     General: Skin is warm and dry.   Neurological:      Mental Status: She is alert and oriented to person, place, and time.          Significant Labs:  I have reviewed all pertinent lab results within the past 24 hours.  CBC:   Recent Labs   Lab 12/10/23  0920   WBC 12.36   RBC 4.02   HGB 12.2   HCT 36.3*      MCV 90   MCH 30.3   MCHC 33.6       BMP:   Recent Labs   Lab 12/10/23  0920   *      K 3.4*      CO2 20*   BUN 12   CREATININE 0.6   CALCIUM 8.3*       Cardiac markers:   Recent Labs   Lab 12/09/23  0926   TROPONINI <0.006         Significant Diagnostics:  Abdominal x-ray:   Impression:     Abnormal study with several nonspecific dilated loops of small bowel in the left abdomen.  Nondilated air-filled large bowel.  Possible ileus or partial bowel obstruction.     Chest x-ray:   Impression:     No acute findings.

## 2023-12-10 NOTE — PLAN OF CARE
Discussed plan of care with patient which verbalized understanding.  Patient remains free from injury.  Safety and comfort precautions maintained this shift.   Call light and personal belongings within reach, bed in low position with bed wheels locked. Side rails up times two.  No s/s of acute distress.   Purposeful rounding continued this shift.  Pain levels are being  controlled per MD order.With  IVF infusing.  Vital signs continued per order.  Q2 repositioning is needed  Patient mobility status remains with assist    Chart and orders review completed.   Patient education about care completed.     Problem: Adult Inpatient Plan of Care  Goal: Plan of Care Review  Outcome: Ongoing, Progressing  Goal: Patient-Specific Goal (Individualized)  Outcome: Ongoing, Progressing  Flowsheets (Taken 12/10/2023 0200)  Anxieties, Fears or Concerns: pain control  Individualized Care Needs: pain control  Goal: Absence of Hospital-Acquired Illness or Injury  Outcome: Ongoing, Progressing  Goal: Optimal Comfort and Wellbeing  Outcome: Ongoing, Progressing  Goal: Readiness for Transition of Care  Outcome: Ongoing, Progressing

## 2023-12-11 VITALS
TEMPERATURE: 98 F | DIASTOLIC BLOOD PRESSURE: 59 MMHG | WEIGHT: 199.06 LBS | SYSTOLIC BLOOD PRESSURE: 124 MMHG | HEART RATE: 66 BPM | BODY MASS INDEX: 33.13 KG/M2 | RESPIRATION RATE: 18 BRPM | OXYGEN SATURATION: 98 %

## 2023-12-11 PROCEDURE — 25000003 PHARM REV CODE 250: Performed by: SURGERY

## 2023-12-11 PROCEDURE — 99232 SBSQ HOSP IP/OBS MODERATE 35: CPT | Mod: ,,, | Performed by: STUDENT IN AN ORGANIZED HEALTH CARE EDUCATION/TRAINING PROGRAM

## 2023-12-11 PROCEDURE — 99232 PR SUBSEQUENT HOSPITAL CARE,LEVL II: ICD-10-PCS | Mod: ,,, | Performed by: STUDENT IN AN ORGANIZED HEALTH CARE EDUCATION/TRAINING PROGRAM

## 2023-12-11 PROCEDURE — 25000003 PHARM REV CODE 250: Performed by: EMERGENCY MEDICINE

## 2023-12-11 PROCEDURE — 63600175 PHARM REV CODE 636 W HCPCS: Performed by: STUDENT IN AN ORGANIZED HEALTH CARE EDUCATION/TRAINING PROGRAM

## 2023-12-11 RX ORDER — POLYETHYLENE GLYCOL 3350 17 G/17G
17 POWDER, FOR SOLUTION ORAL 2 TIMES DAILY
Qty: 100 EACH | Refills: 2 | Status: SHIPPED | OUTPATIENT
Start: 2023-12-11

## 2023-12-11 RX ORDER — ONDANSETRON 4 MG/1
4 TABLET, ORALLY DISINTEGRATING ORAL EVERY 6 HOURS PRN
Qty: 30 TABLET | Refills: 1 | Status: SHIPPED | OUTPATIENT
Start: 2023-12-11

## 2023-12-11 RX ADMIN — LEVOTHYROXINE SODIUM 175 MCG: 150 TABLET ORAL at 09:12

## 2023-12-11 RX ADMIN — OXYCODONE AND ACETAMINOPHEN 1 TABLET: 10; 325 TABLET ORAL at 03:12

## 2023-12-11 RX ADMIN — FAMOTIDINE 20 MG: 10 INJECTION, SOLUTION INTRAVENOUS at 09:12

## 2023-12-11 RX ADMIN — KETOROLAC TROMETHAMINE 15 MG: 30 INJECTION, SOLUTION INTRAMUSCULAR; INTRAVENOUS at 12:12

## 2023-12-11 RX ADMIN — KETOROLAC TROMETHAMINE 15 MG: 30 INJECTION, SOLUTION INTRAMUSCULAR; INTRAVENOUS at 05:12

## 2023-12-11 RX ADMIN — OXYCODONE AND ACETAMINOPHEN 1 TABLET: 10; 325 TABLET ORAL at 01:12

## 2023-12-11 RX ADMIN — SIMETHICONE 80 MG: 80 TABLET, CHEWABLE ORAL at 10:12

## 2023-12-11 RX ADMIN — VENLAFAXINE HYDROCHLORIDE 225 MG: 150 CAPSULE, EXTENDED RELEASE ORAL at 09:12

## 2023-12-11 NOTE — DISCHARGE SUMMARY
[facility]  Discharge Note  Short Stay    * No surgery found *      OUTCOME: Patient tolerated treatment/procedure well without complication and is now ready for discharge.    DISPOSITION: Home or Self Care    FINAL DIAGNOSIS:  Generalized abdominal pain    FOLLOWUP: With primary care provider    DISCHARGE INSTRUCTIONS:    Discharge Procedure Orders   Diet Adult Regular     Notify your health care provider if you experience any of the following:  temperature >100.4     Notify your health care provider if you experience any of the following:  persistent nausea and vomiting or diarrhea     Notify your health care provider if you experience any of the following:  severe uncontrolled pain     Notify your health care provider if you experience any of the following:  difficulty breathing or increased cough     Activity as tolerated        TIME SPENT ON DISCHARGE: 15 minutes  
WFL

## 2023-12-11 NOTE — PLAN OF CARE
[facility]  Initial Discharge Assessment       Primary Care Provider: Jeremiah Varner MD    Admission Diagnosis: Small bowel obstruction [K56.609]  Generalized abdominal pain [R10.84]    Admission Date: 12/7/2023  Expected Discharge Date: 12/11/2023    Transition of Care Barriers: None    Payor: MEDICAID / Plan: [place] [employer] / Product Type: Managed Medicaid /     No emergency contact information on file.    Discharge Plan A: Home with family         [facility] - [place] - [id number] [place]  [address] [place] [address]  Phone: [phone] Fax: [phone]      Initial Assessment (most recent)       Adult Discharge Assessment - 12/11/23 1427          Discharge Assessment    Assessment Type Discharge Planning Assessment     Confirmed/corrected address, phone number and insurance Yes     Confirmed Demographics Correct on Facesheet     Source of Information patient     People in Home child(cat), dependent;spouse;other relative(s)     Do you expect to return to your current living situation? Yes     Do you have help at home or someone to help you manage your care at home? Yes     Who are your caregiver(s) and their phone number(s)? brother     Prior to hospitilization cognitive status: Alert/Oriented     Current cognitive status: Alert/Oriented     Walking or Climbing Stairs Difficulty yes     Walking or Climbing Stairs ambulation difficulty, requires equipment     Mobility Management rw     Dressing/Bathing Difficulty yes     Dressing/Bathing bathing difficulty, requires equipment     Dressing/Bathing Management uses bsc as sc     Do you have any problems with: Errands/Grocery     Equipment Currently Used at Home walker, rolling;bedside commode     Readmission within 30 days? No     Patient currently being followed by outpatient case management? No     Do you currently have service(s) that help you manage your care at home? No     Do you take prescription medications?  Yes     Do you have prescription coverage? Yes     Do you have any problems affording any of your prescribed medications? No     Is the patient taking medications as prescribed? yes     How do you get to doctors appointments? family or friend will provide     Are you on dialysis? No     Do you take coumadin? No     Discharge Plan A Home with family     DME Needed Upon Discharge  none     Discharge Plan discussed with: Patient     Transition of Care Barriers None

## 2023-12-11 NOTE — ASSESSMENT & PLAN NOTE
musculoskeletal as patient has history of recent rib fractures and feels this is associated with it.  Typically controlled with pain medications
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
## 2023-12-11 NOTE — ASSESSMENT & PLAN NOTE
48 y.o. female with new onset abdominal pain worse to LLQ, n/v, constipation     - advance to regular diet  - PRN pain meds and antiemetics   - out of bed to chair and ambulation  - DVT prophylaxis  - patient with chronic pain on medications, discussed that this may be causing her constipation, if small-bowel follow-through negative for obstruction will attempt to wean pain and increased bowel regimen    Possible dc today if tolerates diet

## 2023-12-11 NOTE — PLAN OF CARE
[facility]  Discharge Final Note    Primary Care Provider: Jeremiah Varner MD    Expected Discharge Date: 12/11/2023    Final Discharge Note (most recent)       Final Note - 12/11/23 1433          Final Note    Assessment Type Final Discharge Note     Anticipated Discharge Disposition Home or Self Care        Post-Acute Status    Discharge Delays None known at this time                     Important Message from Medicare      Discharge home, no home health or dme orders noted.              Orthopedic

## 2023-12-11 NOTE — SUBJECTIVE & OBJECTIVE
Interval History: had multiple BM yesterday after SBFT. Has not had diet. Otherwise still some cramping pain but overall improved    Medications:  Continuous Infusions:  Scheduled Meds:   cloNIDine  0.2 mg Oral QHS    enoxparin  40 mg Subcutaneous Q24H (prophylaxis, 1700)    famotidine (PF)  20 mg Intravenous Q12H    ketorolac  15 mg Intravenous Q6H    levothyroxine  175 mcg Oral Daily    venlafaxine  225 mg Oral Daily     PRN Meds:cyclobenzaprine, melatonin, morphine, ondansetron, oxyCODONE-acetaminophen, oxyCODONE-acetaminophen, prochlorperazine, simethicone, sodium chloride 0.9%     Review of patient's allergies indicates:   Allergen Reactions    Penicillins Anaphylaxis    Suman Itching     Patient states she is allergic to suman wipes    Hydrocortisone Rash     Objective:     Vital Signs (Most Recent):  Temp: 97.8 °F (36.6 °C) (12/11/23 0451)  Pulse: (!) 59 (12/11/23 0451)  Resp: 20 (12/11/23 0451)  BP: 116/71 (12/11/23 0451)  SpO2: 97 % (12/11/23 0451) Vital Signs (24h Range):  Temp:  [97.8 °F (36.6 °C)-98.6 °F (37 °C)] 97.8 °F (36.6 °C)  Pulse:  [59-84] 59  Resp:  [16-20] 20  SpO2:  [95 %-99 %] 97 %  BP: (116-142)/(71-81) 116/71     Weight: 90.3 kg (199 lb 1.2 oz) (per MIRELLA Pandya)  Body mass index is 33.13 kg/m².    Intake/Output - Last 3 Shifts         12/09 0700  12/10 0659 12/10 0700  12/11 0659 12/11 0700  12/12 0659    P.O. 370      I.V. (mL/kg) 607.2 (6.7)      Total Intake(mL/kg) 977.2 (10.8)      Net +977.2             Urine Occurrence 4 x      Stool Occurrence 10 x               Physical Exam  Constitutional:       Appearance: She is well-developed.   HENT:      Head: Normocephalic and atraumatic.   Eyes:      Conjunctiva/sclera: Conjunctivae normal.      Pupils: Pupils are equal, round, and reactive to light.   Neck:      Thyroid: No thyromegaly.   Cardiovascular:      Rate and Rhythm: Normal rate and regular rhythm.   Pulmonary:      Effort: Pulmonary effort is normal. No respiratory distress.    Abdominal:      General: There is no distension.      Palpations: Abdomen is soft. There is no mass.      Tenderness: There is no abdominal tenderness.   Musculoskeletal:         General: No tenderness. Normal range of motion.      Cervical back: Normal range of motion.   Skin:     General: Skin is warm and dry.      Capillary Refill: Capillary refill takes less than 2 seconds.   Neurological:      General: No focal deficit present.      Mental Status: She is alert and oriented to person, place, and time.          Significant Labs:  I have reviewed all pertinent lab results within the past 24 hours.    Significant Diagnostics:  I have reviewed all pertinent imaging results/findings within the past 24 hours.  CXR: I have reviewed all pertinent results/findings within the past 24 hours and my personal findings are:  SBFT with transit to colon in 45 mins

## 2023-12-27 ENCOUNTER — OFFICE VISIT (OUTPATIENT)
Dept: PRIMARY CARE CLINIC | Facility: CLINIC | Age: 48
End: 2023-12-27
Payer: MEDICAID

## 2023-12-27 DIAGNOSIS — M54.9 DORSALGIA, UNSPECIFIED: ICD-10-CM

## 2023-12-27 DIAGNOSIS — R15.9 INCONTINENCE OF FECES, UNSPECIFIED FECAL INCONTINENCE TYPE: Primary | ICD-10-CM

## 2023-12-27 DIAGNOSIS — M79.89 LEG SWELLING: ICD-10-CM

## 2023-12-27 DIAGNOSIS — R94.31 ABNORMAL EKG: ICD-10-CM

## 2023-12-27 DIAGNOSIS — L53.9 LEG ERYTHEMA: ICD-10-CM

## 2023-12-27 DIAGNOSIS — S32.019S CLOSED FRACTURE OF FIRST LUMBAR VERTEBRA, UNSPECIFIED FRACTURE MORPHOLOGY, SEQUELA: ICD-10-CM

## 2023-12-27 PROCEDURE — 99215 OFFICE O/P EST HI 40 MIN: CPT | Mod: 95,,, | Performed by: FAMILY MEDICINE

## 2023-12-27 PROCEDURE — 99215 PR OFFICE/OUTPT VISIT, EST, LEVL V, 40-54 MIN: ICD-10-PCS | Mod: 95,,, | Performed by: FAMILY MEDICINE

## 2023-12-27 RX ORDER — CEPHALEXIN 500 MG/1
500 CAPSULE ORAL EVERY 6 HOURS
Qty: 28 CAPSULE | Refills: 0 | Status: SHIPPED | OUTPATIENT
Start: 2023-12-27 | End: 2024-01-03
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
## 2023-12-28 ENCOUNTER — HOSPITAL ENCOUNTER (OUTPATIENT)
Dept: RADIOLOGY | Facility: HOSPITAL | Age: 48
Discharge: HOME | End: 2023-12-28
Attending: FAMILY MEDICINE
Payer: MEDICAID

## 2023-12-28 DIAGNOSIS — L53.9 LEG ERYTHEMA: ICD-10-CM

## 2023-12-28 DIAGNOSIS — M79.89 LEG SWELLING: ICD-10-CM

## 2023-12-28 PROCEDURE — 93970 EXTREMITY STUDY: CPT | Mod: 26,,, | Performed by: RADIOLOGY

## 2023-12-28 PROCEDURE — 93970 EXTREMITY STUDY: CPT | Mod: TC,PO

## 2023-12-28 PROCEDURE — 93970 US LOWER EXTREMITY VEINS BILATERAL: ICD-10-PCS | Mod: 26,,, | Performed by: RADIOLOGY

## 2024-02-06 DIAGNOSIS — Z12.11 COLON CANCER SCREENING: ICD-10-CM

## 2024-02-12 NOTE — TELEPHONE ENCOUNTER
No care due was identified.  [facility] Embedded Care Due Messages. Reference number: [id number].   2/12/2024 2:30:36 PM CST

## 2024-02-15 RX ORDER — ONDANSETRON 8 MG/1
8 TABLET, ORALLY DISINTEGRATING ORAL EVERY 6 HOURS PRN
Qty: 30 TABLET | Refills: 2 | Status: SHIPPED | OUTPATIENT
Start: 2024-02-15 | End: 2024-05-15 | Stop reason: SDUPTHER

## 2024-05-15 ENCOUNTER — PATIENT MESSAGE (OUTPATIENT)
Dept: PRIMARY CARE CLINIC | Facility: CLINIC | Age: 49
End: 2024-05-15
Payer: MEDICAID

## 2024-05-16 NOTE — TELEPHONE ENCOUNTER
No care due was identified.  [facility] Embedded Care Due Messages. Reference number: [id number].   5/15/2024 11:19:33 PM CDT

## 2024-05-19 RX ORDER — SUMATRIPTAN SUCCINATE 100 MG/1
TABLET ORAL
Qty: 18 TABLET | Refills: 2 | OUTPATIENT
Start: 2024-05-19

## 2024-05-19 NOTE — TELEPHONE ENCOUNTER
Quick DC. Request already responded to by other means (e.g. phone or fax)   Refill Authorization Note   Sami Lionel  is requesting a refill authorization.  Brief Assessment and Rationale for Refill:  Quick Discontinue  Medication Therapy Plan:   Ondansetron outside of ORC protocol    Medication Reconciliation Completed:  No      Comments:     Note composed:4:41 PM 05/19/2024

## 2024-05-20 RX ORDER — ONDANSETRON 8 MG/1
8 TABLET, ORALLY DISINTEGRATING ORAL EVERY 6 HOURS PRN
Qty: 30 TABLET | Refills: 2 | Status: SHIPPED | OUTPATIENT
Start: 2024-05-20

## 2024-06-04 ENCOUNTER — OFFICE VISIT (OUTPATIENT)
Dept: PRIMARY CARE CLINIC | Facility: CLINIC | Age: 49
End: 2024-06-04
Payer: MEDICAID

## 2024-06-04 ENCOUNTER — PATIENT MESSAGE (OUTPATIENT)
Dept: PRIMARY CARE CLINIC | Facility: CLINIC | Age: 49
End: 2024-06-04

## 2024-06-04 DIAGNOSIS — J96.11 CHRONIC HYPOXEMIC RESPIRATORY FAILURE: ICD-10-CM

## 2024-06-04 DIAGNOSIS — G62.9 NEUROPATHY: Primary | ICD-10-CM

## 2024-06-04 PROCEDURE — 99214 OFFICE O/P EST MOD 30 MIN: CPT | Mod: 95,,, | Performed by: FAMILY MEDICINE
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
## 2024-06-05 RX ORDER — SUMATRIPTAN SUCCINATE 100 MG/1
TABLET ORAL
Qty: 27 TABLET | Refills: 2 | Status: SHIPPED | OUTPATIENT
Start: 2024-06-05

## 2024-06-05 RX ORDER — PREGABALIN 75 MG/1
75 CAPSULE ORAL 2 TIMES DAILY
Qty: 60 CAPSULE | Refills: 6 | Status: SHIPPED | OUTPATIENT
Start: 2024-06-05 | End: 2024-12-04

## 2024-06-17 DIAGNOSIS — M62.838 MUSCLE SPASM: ICD-10-CM

## 2024-06-17 NOTE — TELEPHONE ENCOUNTER
No care due was identified.  [facility] Embedded Care Due Messages. Reference number: [id number].   6/17/2024 9:26:56 AM CDT

## 2024-06-18 RX ORDER — CYCLOBENZAPRINE HCL 10 MG
TABLET ORAL
Qty: 30 TABLET | Refills: 3 | Status: SHIPPED | OUTPATIENT
Start: 2024-06-18

## 2024-07-05 DIAGNOSIS — G62.9 NEUROPATHY: ICD-10-CM

## 2024-07-05 NOTE — TELEPHONE ENCOUNTER
No care due was identified.  [facility] Embedded Care Due Messages. Reference number: [id number].   7/05/2024 11:13:35 AM CDT

## 2024-07-11 DIAGNOSIS — Z12.31 OTHER SCREENING MAMMOGRAM: ICD-10-CM

## 2024-07-16 RX ORDER — PREGABALIN 75 MG/1
75 CAPSULE ORAL 2 TIMES DAILY
Qty: 60 CAPSULE | Refills: 0 | Status: SHIPPED | OUTPATIENT
Start: 2024-07-16 | End: 2025-01-14

## 2024-07-25 ENCOUNTER — OFFICE VISIT (OUTPATIENT)
Dept: RHEUMATOLOGY | Facility: CLINIC | Age: 49
End: 2024-07-25
Payer: MEDICAID

## 2024-07-25 VITALS
SYSTOLIC BLOOD PRESSURE: 112 MMHG | HEIGHT: 65 IN | BODY MASS INDEX: 29.87 KG/M2 | DIASTOLIC BLOOD PRESSURE: 65 MMHG | WEIGHT: 179.25 LBS | HEART RATE: 79 BPM

## 2024-07-25 DIAGNOSIS — M79.7 FIBROMYALGIA: ICD-10-CM

## 2024-07-25 DIAGNOSIS — L40.50 PSORIATIC ARTHRITIS: ICD-10-CM

## 2024-07-25 DIAGNOSIS — M06.9 RHEUMATOID ARTHRITIS, INVOLVING UNSPECIFIED SITE, UNSPECIFIED WHETHER RHEUMATOID FACTOR PRESENT: Primary | ICD-10-CM

## 2024-07-25 DIAGNOSIS — E03.9 HYPOTHYROIDISM, UNSPECIFIED TYPE: ICD-10-CM

## 2024-07-25 DIAGNOSIS — G89.4 CHRONIC PAIN SYNDROME: ICD-10-CM

## 2024-07-25 DIAGNOSIS — E55.9 VITAMIN D DEFICIENCY: ICD-10-CM

## 2024-07-25 DIAGNOSIS — L40.9 PSORIASIS: ICD-10-CM

## 2024-07-25 PROCEDURE — 3074F SYST BP LT 130 MM HG: CPT | Mod: CPTII,,, | Performed by: INTERNAL MEDICINE

## 2024-07-25 PROCEDURE — 99999 PR PBB SHADOW E&M-EST. PATIENT-LVL IV: CPT | Mod: PBBFAC,,, | Performed by: INTERNAL MEDICINE

## 2024-07-25 PROCEDURE — 96372 THER/PROPH/DIAG INJ SC/IM: CPT | Mod: PBBFAC,PO

## 2024-07-25 PROCEDURE — 3078F DIAST BP <80 MM HG: CPT | Mod: CPTII,,, | Performed by: INTERNAL MEDICINE

## 2024-07-25 PROCEDURE — 99215 OFFICE O/P EST HI 40 MIN: CPT | Mod: 25,S$PBB,, | Performed by: INTERNAL MEDICINE

## 2024-07-25 PROCEDURE — 99999PBSHW PR PBB SHADOW TECHNICAL ONLY FILED TO HB: Mod: PBBFAC,,,

## 2024-07-25 PROCEDURE — 1159F MED LIST DOCD IN RCRD: CPT | Mod: CPTII,,, | Performed by: INTERNAL MEDICINE

## 2024-07-25 PROCEDURE — 3008F BODY MASS INDEX DOCD: CPT | Mod: CPTII,,, | Performed by: INTERNAL MEDICINE

## 2024-07-25 PROCEDURE — 99214 OFFICE O/P EST MOD 30 MIN: CPT | Mod: PBBFAC,PO | Performed by: INTERNAL MEDICINE

## 2024-07-25 RX ORDER — SECUKINUMAB 150 MG/ML
150 INJECTION SUBCUTANEOUS
Qty: 1 ML | Refills: 11 | Status: SHIPPED | OUTPATIENT
Start: 2024-07-25

## 2024-07-25 RX ORDER — IBUPROFEN AND FAMOTIDINE 26.6; 8 MG/1; MG/1
1 TABLET ORAL 3 TIMES DAILY
Qty: 90 TABLET | Refills: 12 | Status: SHIPPED | OUTPATIENT
Start: 2024-07-25 | End: 2025-07-25

## 2024-07-25 RX ORDER — SECUKINUMAB 150 MG/ML
150 INJECTION SUBCUTANEOUS WEEKLY
Qty: 5 ML | Refills: 0 | Status: SHIPPED | OUTPATIENT
Start: 2024-07-25

## 2024-07-25 RX ORDER — CLOBETASOL PROPIONATE 0.5 MG/G
OINTMENT TOPICAL 2 TIMES DAILY
Qty: 60 G | Refills: 3 | Status: SHIPPED | OUTPATIENT
Start: 2024-07-25 | End: 2025-01-21

## 2024-07-25 RX ORDER — PREGABALIN 100 MG/1
200 CAPSULE ORAL 3 TIMES DAILY
Qty: 180 CAPSULE | Refills: 6 | Status: SHIPPED | OUTPATIENT
Start: 2024-07-25 | End: 2025-01-23

## 2024-07-25 RX ORDER — METHYLPREDNISOLONE ACETATE 80 MG/ML
160 INJECTION, SUSPENSION INTRA-ARTICULAR; INTRALESIONAL; INTRAMUSCULAR; SOFT TISSUE
Status: COMPLETED | OUTPATIENT
Start: 2024-07-25 | End: 2024-07-25

## 2024-07-25 RX ORDER — KETOROLAC TROMETHAMINE 30 MG/ML
60 INJECTION, SOLUTION INTRAMUSCULAR; INTRAVENOUS
Status: COMPLETED | OUTPATIENT
Start: 2024-07-25 | End: 2024-07-25

## 2024-07-25 RX ORDER — ERGOCALCIFEROL 1.25 MG/1
50000 CAPSULE ORAL
Qty: 12 CAPSULE | Refills: 0 | Status: ACTIVE | OUTPATIENT
Start: 2024-07-25

## 2024-07-25 RX ADMIN — METHYLPREDNISOLONE ACETATE 160 MG: 80 INJECTION, SUSPENSION INTRA-ARTICULAR; INTRALESIONAL; INTRAMUSCULAR; SOFT TISSUE at 05:07

## 2024-07-25 RX ADMIN — KETOROLAC TROMETHAMINE 60 MG: 60 INJECTION, SOLUTION INTRAMUSCULAR at 05:07

## 2024-07-25 ASSESSMENT — ROUTINE ASSESSMENT OF PATIENT INDEX DATA (RAPID3)
PSYCHOLOGICAL DISTRESS SCORE: 7.7
MDHAQ FUNCTION SCORE: 1.4
PATIENT GLOBAL ASSESSMENT SCORE: 7
TOTAL RAPID3 SCORE: 6.22
PAIN SCORE: 7

## 2024-07-31 ENCOUNTER — PATIENT MESSAGE (OUTPATIENT)
Dept: RHEUMATOLOGY | Facility: CLINIC | Age: 49
End: 2024-07-31
Payer: MEDICAID

## 2024-08-01 RX ORDER — FAMOTIDINE 40 MG/1
40 TABLET, FILM COATED ORAL DAILY
Qty: 30 TABLET | Refills: 11 | Status: SHIPPED | OUTPATIENT
Start: 2024-08-01 | End: 2025-08-01

## 2024-08-01 RX ORDER — IBUPROFEN 800 MG/1
800 TABLET ORAL 3 TIMES DAILY
Qty: 90 TABLET | Refills: 3 | Status: SHIPPED | OUTPATIENT
Start: 2024-08-01

## 2024-08-02 ENCOUNTER — OFFICE VISIT (OUTPATIENT)
Dept: PRIMARY CARE CLINIC | Facility: CLINIC | Age: 49
End: 2024-08-02
Payer: MEDICAID

## 2024-08-02 DIAGNOSIS — M48.00 SPINAL STENOSIS, UNSPECIFIED SPINAL REGION: Primary | ICD-10-CM
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
## 2024-08-07 RX ORDER — ONDANSETRON 8 MG/1
8 TABLET, ORALLY DISINTEGRATING ORAL EVERY 6 HOURS PRN
Qty: 30 TABLET | Refills: 2 | Status: SHIPPED | OUTPATIENT
Start: 2024-08-07

## 2024-08-13 ENCOUNTER — HOSPITAL ENCOUNTER (OUTPATIENT)
Dept: RADIOLOGY | Facility: HOSPITAL | Age: 49
Discharge: HOME | End: 2024-08-13
Attending: INTERNAL MEDICINE
Payer: MEDICAID

## 2024-08-13 DIAGNOSIS — E03.9 HYPOTHYROIDISM, UNSPECIFIED TYPE: ICD-10-CM

## 2024-08-13 DIAGNOSIS — L40.50 PSORIATIC ARTHRITIS: ICD-10-CM

## 2024-08-13 DIAGNOSIS — L40.9 PSORIASIS: ICD-10-CM

## 2024-08-13 DIAGNOSIS — M06.9 RHEUMATOID ARTHRITIS, INVOLVING UNSPECIFIED SITE, UNSPECIFIED WHETHER RHEUMATOID FACTOR PRESENT: ICD-10-CM

## 2024-08-13 DIAGNOSIS — M79.7 FIBROMYALGIA: ICD-10-CM

## 2024-08-13 DIAGNOSIS — E55.9 VITAMIN D DEFICIENCY: ICD-10-CM

## 2024-08-13 PROCEDURE — 72040 X-RAY EXAM NECK SPINE 2-3 VW: CPT | Mod: 26,,, | Performed by: RADIOLOGY

## 2024-08-13 PROCEDURE — 77091 TBS TECHL CALCULATION ONLY: CPT | Mod: PO

## 2024-08-13 PROCEDURE — 72040 X-RAY EXAM NECK SPINE 2-3 VW: CPT | Mod: TC,FY,PO

## 2024-08-13 PROCEDURE — 77080 DXA BONE DENSITY AXIAL: CPT | Mod: TC,PO

## 2024-09-16 NOTE — TELEPHONE ENCOUNTER
No care due was identified.  [facility] Embedded Care Due Messages. Reference number: [id number].   9/16/2024 3:49:01 PM CDT

## 2024-09-18 RX ORDER — ONDANSETRON 8 MG/1
8 TABLET, ORALLY DISINTEGRATING ORAL EVERY 6 HOURS PRN
Qty: 30 TABLET | Refills: 2 | Status: SHIPPED | OUTPATIENT
Start: 2024-09-18

## 2024-10-11 DIAGNOSIS — M62.838 MUSCLE SPASM: ICD-10-CM

## 2024-10-11 RX ORDER — CYCLOBENZAPRINE HCL 10 MG
TABLET ORAL
Qty: 30 TABLET | Refills: 3 | Status: SHIPPED | OUTPATIENT
Start: 2024-10-11

## 2024-10-11 NOTE — TELEPHONE ENCOUNTER
No care due was identified.  [facility] Embedded Care Due Messages. Reference number: [id number].   10/11/2024 9:11:56 AM CDT

## 2024-10-29 ENCOUNTER — PATIENT MESSAGE (OUTPATIENT)
Dept: NEUROSURGERY | Facility: CLINIC | Age: 49
End: 2024-10-29
Payer: MEDICAID

## 2024-10-29 ENCOUNTER — TELEPHONE (OUTPATIENT)
Dept: NEUROSURGERY | Facility: CLINIC | Age: 49
End: 2024-10-29
Payer: MEDICAID

## 2024-11-11 ENCOUNTER — HOSPITAL ENCOUNTER (OUTPATIENT)
Dept: RADIOLOGY | Facility: HOSPITAL | Age: 49
Discharge: HOME | End: 2024-11-11
Attending: NEUROLOGICAL SURGERY
Payer: MEDICAID

## 2024-11-11 ENCOUNTER — OFFICE VISIT (OUTPATIENT)
Dept: NEUROSURGERY | Facility: CLINIC | Age: 49
End: 2024-11-11
Payer: MEDICAID

## 2024-11-11 VITALS
RESPIRATION RATE: 18 BRPM | HEART RATE: 72 BPM | HEIGHT: 65 IN | SYSTOLIC BLOOD PRESSURE: 141 MMHG | WEIGHT: 179.25 LBS | BODY MASS INDEX: 29.87 KG/M2 | DIASTOLIC BLOOD PRESSURE: 77 MMHG

## 2024-11-11 DIAGNOSIS — M48.00 SPINAL STENOSIS, UNSPECIFIED SPINAL REGION: ICD-10-CM

## 2024-11-11 PROCEDURE — 1159F MED LIST DOCD IN RCRD: CPT | Mod: CPTII,,, | Performed by: NEUROLOGICAL SURGERY

## 2024-11-11 PROCEDURE — 72110 X-RAY EXAM L-2 SPINE 4/>VWS: CPT | Mod: 26,,, | Performed by: RADIOLOGY

## 2024-11-11 PROCEDURE — 3077F SYST BP >= 140 MM HG: CPT | Mod: CPTII,,, | Performed by: NEUROLOGICAL SURGERY

## 2024-11-11 PROCEDURE — 99215 OFFICE O/P EST HI 40 MIN: CPT | Mod: PBBFAC,PN,25 | Performed by: NEUROLOGICAL SURGERY

## 2024-11-11 PROCEDURE — 72110 X-RAY EXAM L-2 SPINE 4/>VWS: CPT | Mod: TC,FY,PO

## 2024-11-11 PROCEDURE — 3078F DIAST BP <80 MM HG: CPT | Mod: CPTII,,, | Performed by: NEUROLOGICAL SURGERY

## 2024-11-11 PROCEDURE — 3008F BODY MASS INDEX DOCD: CPT | Mod: CPTII,,, | Performed by: NEUROLOGICAL SURGERY

## 2024-11-11 PROCEDURE — 99205 OFFICE O/P NEW HI 60 MIN: CPT | Mod: S$PBB,,, | Performed by: NEUROLOGICAL SURGERY

## 2024-11-11 PROCEDURE — 99999 PR PBB SHADOW E&M-EST. PATIENT-LVL V: CPT | Mod: PBBFAC,,, | Performed by: NEUROLOGICAL SURGERY
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
## 2024-11-13 RX ORDER — IBUPROFEN 800 MG/1
800 TABLET ORAL 3 TIMES DAILY
Qty: 90 TABLET | Refills: 3 | Status: SHIPPED | OUTPATIENT
Start: 2024-11-13

## 2024-11-14 ENCOUNTER — TELEPHONE (OUTPATIENT)
Dept: NEUROSURGERY | Facility: CLINIC | Age: 49
End: 2024-11-14
Payer: MEDICAID

## 2024-11-14 DIAGNOSIS — M48.00 SPINAL STENOSIS, UNSPECIFIED SPINAL REGION: Primary | ICD-10-CM

## 2024-11-14 RX ORDER — SODIUM CHLORIDE, SODIUM LACTATE, POTASSIUM CHLORIDE, CALCIUM CHLORIDE 600; 310; 30; 20 MG/100ML; MG/100ML; MG/100ML; MG/100ML
INJECTION, SOLUTION INTRAVENOUS CONTINUOUS
OUTPATIENT
Start: 2024-11-14

## 2024-11-14 RX ORDER — VANCOMYCIN/0.9 % SOD CHLORIDE 1 G/100 ML
1000 PLASTIC BAG, INJECTION (ML) INTRAVENOUS
OUTPATIENT
Start: 2024-11-14

## 2024-11-14 RX ORDER — LIDOCAINE HYDROCHLORIDE 10 MG/ML
1 INJECTION, SOLUTION EPIDURAL; INFILTRATION; INTRACAUDAL; PERINEURAL ONCE
OUTPATIENT
Start: 2024-11-14 | End: 2024-11-14

## 2024-11-14 NOTE — TELEPHONE ENCOUNTER
Ms. Flowers will be having surgery on 12/04/24 with Dr. Reeves, Neurosurgeon, at [facility]. We are reaching out to obtain a surgical clearance from Dr. Varner to ensure the safety of our patient. The surgery is Microdisectomy and will be under general anesthesia. This procedure typically lasts approximately 3 hours. We request that the patient hold all anticoagulant/antiinflammatory medications for 5-7 days prior to surgery. Please let us know if our office can be of further assistance.     Thank you    Minoo Myers LPN  P: [phone]  F: [phone]

## 2024-11-15 ENCOUNTER — TELEPHONE (OUTPATIENT)
Dept: NEUROSURGERY | Facility: CLINIC | Age: 49
End: 2024-11-15
Payer: MEDICAID

## 2024-11-19 ENCOUNTER — PATIENT MESSAGE (OUTPATIENT)
Dept: NEUROSURGERY | Facility: CLINIC | Age: 49
End: 2024-11-19
Payer: MEDICAID

## 2025-01-07 DIAGNOSIS — M06.9 RHEUMATOID ARTHRITIS, INVOLVING UNSPECIFIED SITE, UNSPECIFIED WHETHER RHEUMATOID FACTOR PRESENT: ICD-10-CM

## 2025-01-07 DIAGNOSIS — G89.4 CHRONIC PAIN SYNDROME: ICD-10-CM

## 2025-01-07 RX ORDER — SUMATRIPTAN SUCCINATE 100 MG/1
TABLET ORAL
Qty: 27 TABLET | Refills: 1 | Status: SHIPPED | OUTPATIENT
Start: 2025-01-07

## 2025-01-07 NOTE — TELEPHONE ENCOUNTER
Refill Decision Note   Sami MaganaSte. Genevieve  is requesting a refill authorization.  Brief Assessment and Rationale for Refill:  Approve     Medication Therapy Plan:        Comments:     Note composed:11:08 AM 01/07/2025

## 2025-01-07 NOTE — TELEPHONE ENCOUNTER
No care due was identified.  [facility] Embedded Care Due Messages. Reference number: [id number].   1/07/2025 10:55:03 AM CST

## 2025-01-08 RX ORDER — PREGABALIN 100 MG/1
200 CAPSULE ORAL 3 TIMES DAILY
Qty: 180 CAPSULE | Refills: 5 | Status: SHIPPED | OUTPATIENT
Start: 2025-01-08 | End: 2025-07-09

## 2025-03-14 ENCOUNTER — PATIENT OUTREACH (OUTPATIENT)
Dept: ADMINISTRATIVE | Facility: HOSPITAL | Age: 50
End: 2025-03-14
Payer: MEDICAID

## 2025-04-02 ENCOUNTER — PATIENT MESSAGE (OUTPATIENT)
Dept: RHEUMATOLOGY | Facility: CLINIC | Age: 50
End: 2025-04-02
Payer: MEDICAID

## 2025-04-02 ENCOUNTER — E-VISIT (OUTPATIENT)
Dept: PRIMARY CARE CLINIC | Facility: CLINIC | Age: 50
End: 2025-04-02
Payer: MEDICAID

## 2025-04-02 DIAGNOSIS — L84 CORN OR CALLUS: ICD-10-CM

## 2025-04-02 DIAGNOSIS — L84 CORN OF FOOT: Primary | ICD-10-CM

## 2025-04-02 NOTE — PROGRESS NOTES
Patient ID: Sami Tiwariland is a 49 y.o. female.    Chief Complaint: General Illness (Entered automatically based on patient selection in [employer].)    The patient initiated a request through [employer] on 4/2/2025 for evaluation and management with a chief complaint of General Illness (Entered automatically based on patient selection in [employer].)     I evaluated the questionnaire submission on 04/02/2025  .    [facility] Peq Evisit [facility]group-Chronic Conditions    4/2/2025 12:37 PM CDT - Filed by Patient   What do you need help with? Other Concern   Do you agree to participate in an E-Visit? Yes   If you have any of the following symptoms, please go to the nearest emergency room or call 911: I acknowledge   Do you have any of the following pregnancy-related conditions? None   What is the main issue you would like addressed today? Feet and shoulder   Please describe your symptoms. Feet have either growths or deformities from psoriatic arthritis.  walked out all house work falls to be cant be on my feet long at all without paid and sweeping. Cant rest with shoulder feels like a crick but has been there for days   Where is your problem located? Feet shoulders all putting strain on back and hips trying to walk and position myself. Hard to function without being able to walk and I cant rest with my shoulders   On a scale of 1-10, where 10 is the worst you can imagine, how severe are your symptoms? (range: 1 - 10) 8.9   Have you had these symptoms before? No   How long have you had these symptoms? More than a month   What helps with your symptoms? I dont know I have tried Lyrica which helps with the burning but not the shooting pain and swelling I get from using my feet shoulder wont let me get rest wakes me up whenever I move in my sleep shoulder pain has also caused several really bad migraines   What makes your symptoms feel worse? Walking or using my feet shoulder back hips and knees I have to walk on the  sides of my feet in order to get around. I have a ton to do and no help   Are your symptoms related to a condition you currently have? Yes   What condition do you currently have? Psoriatic arthritis   When were you last seen for this condition? 2025   Provide any additional information you feel is important. Could not have the back surgery bc I have no one to drive me since my spouse walked out   Please attach any relevant images or files    Are you able to take your vital signs? No         Encounter Diagnoses   Name Primary?    Corn of foot Yes    Corn or callus         Orders Placed This Encounter   Procedures    Ambulatory referral/consult to Podiatry     Standing Status:   Future     Expected Date:   2025     Expiration Date:   2026     Referral Priority:   Routine     Referral Type:   Consultation     Referral Reason:   Specialty Services Required     Requested Specialty:   Podiatry     Number of Visits Requested:   1      Medications Ordered This Encounter   Medications    salicylic acid 40 % Plst     Sig: Apply 1 each topically once daily.     Dispense:  18 each     Refill:  2        No follow-ups on file.      E-Visit Time Trackin min

## 2025-04-12 ENCOUNTER — E-VISIT (OUTPATIENT)
Dept: PRIMARY CARE CLINIC | Facility: CLINIC | Age: 50
End: 2025-04-12
Payer: MEDICAID

## 2025-04-12 ENCOUNTER — PATIENT MESSAGE (OUTPATIENT)
Dept: PRIMARY CARE CLINIC | Facility: CLINIC | Age: 50
End: 2025-04-12

## 2025-04-12 DIAGNOSIS — M47.816 OSTEOARTHRITIS OF LUMBAR SPINE, UNSPECIFIED SPINAL OSTEOARTHRITIS COMPLICATION STATUS: ICD-10-CM

## 2025-04-12 DIAGNOSIS — L40.50 PSORIATIC ARTHRITIS: Primary | ICD-10-CM

## 2025-04-12 NOTE — PROGRESS NOTES
Patient ID: Sami Maganaberland is a 49 y.o. female.    Chief Complaint: General Illness (Entered automatically based on patient selection in [employer].)    The patient initiated a request through [employer] on 4/12/2025 for evaluation and management with a chief complaint of General Illness (Entered automatically based on patient selection in [employer].)     I evaluated the questionnaire submission on 04/12/2025  .    [facility] Peq Evisit Supergroup-Muscle,Back,Joint    4/12/2025 12:00 PM CDT - Filed by Patient   What do you need help with? Other Concern   Do you agree to participate in an E-Visit? Yes   If you have any of the following symptoms, please go to the nearest emergency room or call 911: I acknowledge   Do you have any of the following pregnancy-related conditions? None   What is the main issue you would like addressed today? Pain   Please describe your symptoms. Neck and shoulder   Where is your problem located? Neck and right shoulder   On a scale of 1-10, where 10 is the worst you can imagine, how severe are your symptoms? (range: 1 - 10) 8.75   Have you had these symptoms before? Yes   How long have you had these symptoms? About a week   What helps with your symptoms? Tons of otc meds   What makes your symptoms feel worse? Hurts constantly   Are your symptoms related to a condition you currently have? Yes   What condition do you currently have? Psoriatic Arthritis and fibromyalgia   When were you last seen for this condition?    Provide any additional information you feel is important. Numb fingers had an appt today could not log in problem with browser   Pain lots of discomfort had a lot I wanted to talk about and couldnt get in the appt need to do it over the phone unreliable transport and live hours away. Need med refills and ?s too   Please attach any relevant images or files    Are you able to take your vital signs? No         No diagnosis found.     No orders of the defined types were placed in this  encounter.           No follow-ups on file.      E-Visit Time Trackin min

## 2025-04-16 RX ORDER — IBUPROFEN 800 MG/1
800 TABLET ORAL 3 TIMES DAILY
Qty: 90 TABLET | Refills: 3 | Status: SHIPPED | OUTPATIENT
Start: 2025-04-16

## 2025-04-21 ENCOUNTER — OFFICE VISIT (OUTPATIENT)
Dept: PRIMARY CARE CLINIC | Facility: CLINIC | Age: 50
End: 2025-04-21
Payer: MEDICAID

## 2025-04-21 DIAGNOSIS — L30.4 INTERTRIGO: ICD-10-CM

## 2025-04-21 DIAGNOSIS — G62.9 NEUROPATHY: ICD-10-CM

## 2025-04-21 DIAGNOSIS — L40.50 PSORIATIC ARTHRITIS: Primary | ICD-10-CM

## 2025-04-21 DIAGNOSIS — M47.816 OSTEOARTHRITIS OF LUMBAR SPINE, UNSPECIFIED SPINAL OSTEOARTHRITIS COMPLICATION STATUS: ICD-10-CM

## 2025-04-21 DIAGNOSIS — M54.9 DORSALGIA, UNSPECIFIED: ICD-10-CM

## 2025-04-21 DIAGNOSIS — L02.91 ABSCESS: ICD-10-CM

## 2025-04-21 DIAGNOSIS — M48.00 SPINAL STENOSIS, UNSPECIFIED SPINAL REGION: ICD-10-CM

## 2025-04-21 PROCEDURE — 98014 SYNCH AUDIO-ONLY EST MOD 30: CPT | Mod: 93,,, | Performed by: FAMILY MEDICINE

## 2025-04-21 RX ORDER — KETOCONAZOLE 20 MG/G
CREAM TOPICAL DAILY
Qty: 60 G | Refills: 4 | Status: SHIPPED | OUTPATIENT
Start: 2025-04-21

## 2025-04-21 RX ORDER — CLINDAMYCIN PALMITATE HYDROCHLORIDE (PEDIATRIC) 75 MG/5ML
150 SOLUTION ORAL EVERY 12 HOURS
Qty: 100 ML | Refills: 3 | Status: SHIPPED | OUTPATIENT
Start: 2025-04-21

## 2025-04-21 RX ORDER — NYSTATIN 100000 [USP'U]/G
10 POWDER TOPICAL 2 TIMES DAILY
Qty: 60 G | Refills: 3 | Status: SHIPPED | OUTPATIENT
Start: 2025-04-21

## 2025-04-21 NOTE — PROGRESS NOTES
Audio Only Telehealth Visit     The patient location is: [place]  The chief complaint leading to consultation is: life stressors / pain / fatigue  Visit type: Virtual visit with audio only (telephone)  Total time spent in medical discussion with patient: 27 minutes  Total time spent on date of the encounter:35 minutes       The reason for the audio only service rather than synchronous audio and video virtual visit was related to technical difficulties or patient preference/necessity.           Each patient to whom I provide medical services by telemedicine is:  (1) informed of the relationship between the physician and patient and the respective role of any other health care provider with respect to management of the patient; and (2) notified that they may decline to receive medical services by telemedicine and may withdraw from such care at any time. Patient verbally consented to receive this service via voice-only telephone call.            This service was not originating from a related E/M service provided within the previous 7 days nor will  to an E/M service or procedure within the next 24 hours or my soonest available appointment.  Prevailing standard of care was able to be met in this audio-only visit.            Clinic Note  4/21/2025      Subjective:       Patient ID:  Sami is a 49 y.o. female being seen for:      History of Present Illness    CHIEF COMPLAINT:  Sami presents today for follow up of multiple chronic conditions including psoriatic arthritis and chronic pain    PSORIATIC ARTHRITIS AND MUSCULOSKELETAL:  She reports increased frequency of psoriatic arthritis flares. She experiences right shoulder pain and numbness in the third, fourth, and fifth digits of her right hand. She has back pain that interferes with household chores, including cleaning and lawn maintenance. To manage back pain, she sleeps in a recliner. She is unable to receive injectable treatments for psoriatic arthritis due to  history of recurrent skin boils.    SKIN CONDITIONS:  She has a history of recurrent skin boils, particularly in skin folds, present since youth. She experiences frequent yeast infections in her paniculus, which she manages with OTC antifungals that provide temporary relief. She has large pendulous skin folds from significant weight loss over the years, causing constant itching and pain in the abdominal area. These skin issues have not shown improvement.    PODIATRIC:  She reports a large corn/mass on her feet causing significant difficulty with ambulation and performing household tasks. Has been using some OTC tape for corn/calluses, will try an make an appointment with her son's podiatrist.     SOCIAL HISTORY:  She recently  from her  and currently lives alone with her children. She has an 18-year-old son with ADHD who is on the autism spectrum and recently underwent an EGD due to illness. She has no family in [place]; her only relatives, including her mother, reside in [place].      ROS:  General: -fever, -chills, -fatigue, -weight gain, -weight loss  Eyes: -vision changes, -redness, -discharge  ENT: -ear pain, -nasal congestion, -sore throat  Cardiovascular: -chest pain, -palpitations, -lower extremity edema  Respiratory: -cough, -shortness of breath  Gastrointestinal: +abdominal pain, -nausea, -vomiting, -diarrhea, -constipation, -blood in stool  Genitourinary: -dysuria, -hematuria, -frequency  Musculoskeletal: +joint pain, -muscle pain, +back pain, +limb pain, +pain with movement, +difficulty walking  Skin: -rash, +lesion, +itching  Neurological: -headache, -dizziness, +numbness, -tingling  Psychiatric: -anxiety, -depression, -sleep difficulty           Medication List with Changes/Refills   New Medications    CLINDAMYCIN (CLEOCIN) 75 MG/5 ML SOLR    Take 10 mLs (150 mg total) by mouth every 12 (twelve) hours. boils   Current Medications    BUPRENORPHINE-NALOXONE 8-2 MG (SUBOXONE) 8-2 MG     "Place 0.25 Film under the tongue 3 (three) times daily.    CLOBETASOL 0.05% (TEMOVATE) 0.05 % OINT    Apply topically 2 (two) times daily.    CLOTRIMAZOLE (LOTRIMIN) 1 % CREAM    Apply topically 2 (two) times daily.    CYCLOBENZAPRINE (FLEXERIL) 10 MG TABLET    TAKE 1 TABLET (10 MG) BY MOUTH 3 TIMES DAILY AS NEEDED FOR MUSCLE SPASMS (PAIN)    ERGOCALCIFEROL (ERGOCALCIFEROL) 50,000 UNIT CAP    Take 1 capsule (50,000 Units total) by mouth every 7 days.    FAMOTIDINE (PEPCID) 40 MG TABLET    Take 1 tablet (40 mg total) by mouth once daily.    HYDROXYZINE PAMOATE (VISTARIL) 50 MG CAP    Take 100 mg by mouth 2 (two) times daily as needed.    IBUPROFEN (ADVIL,MOTRIN) 800 MG TABLET    Take 1 tablet (800 mg total) by mouth 3 (three) times daily.    IBUPROFEN-FAMOTIDINE (DUEXIS) 800-26.6 MG TAB    Take 1 tablet by mouth 3 (three) times daily.    LEVOTHYROXINE (SYNTHROID, LEVOTHROID) 175 MCG TABLET    TAKE 1 tablet (175 mcg total) by mouth before breakfast for thyroid    MIRTAZAPINE (REMERON) 15 MG TABLET    Take 15 mg by mouth every evening.    ONDANSETRON (ZOFRAN-ODT) 8 MG TBDL    Take 1 tablet (8 mg total) by mouth every 6 (six) hours as needed.    PREGABALIN (LYRICA) 100 MG CAPSULE    Take 2 capsules (200 mg total) by mouth 3 (three) times daily.    SALICYLIC ACID 40 % PLST    Apply 1 each topically once daily.    SUMATRIPTAN (IMITREX) 100 MG TABLET    TAKE 1 TABLET AS SINGLE DOSE, MAY REPEAT 1 DOSE IN 2 HRS IF SYMPTOMS PERSIST/RETURN, MAX DOSE IS 100MG/DOSE, 200MG PER 24 HRS    SYRINGE WITH NEEDLE, SAFETY (EASY TOUCH FLIPLOCK SYRINGE) 3 ML 23 GAUGE X 1 1/2" SYRG    To use with methotrexate injections    TRIAMCINOLONE ACETONIDE 0.1% (KENALOG) 0.1 % CREAM    Apply topically.    VENLAFAXINE (EFFEXOR-XR) 75 MG 24 HR CAPSULE    Take 225 mg by mouth once daily.   Changed and/or Refilled Medications    Modified Medication Previous Medication    KETOCONAZOLE (NIZORAL) 2 % CREAM ketoconazole (NIZORAL) 2 % cream       Apply " topically once daily.    Apply topically.    NYSTATIN (MYCOSTATIN) POWDER nystatin (MYCOSTATIN) powder       Apply 10 g topically 2 (two) times daily.    Apply 10 g topically 2 (two) times daily.       Problem List[1]        Objective:      There were no vitals taken for this visit.      Physical Exam      Neurological: Alert & oriented x3. No slurred speech.   Psychiatric: Normal mood. Normal affect. Good insight. Good judgment.              Assessment and Plan:     Assessment & Plan    - Maxed out on multiple pain medications, including Lyrica 200 mg TID and ibuprofen.    PSORIATIC ARTHRITIS:  - Noted that the patient reports having more frequent psoriatic arthritis flares.  - Discussed that the patient has been offered injections in the past for psoriatic arthritis, but is unable to receive them due to recurrent skin boils.   - f/u with rheumatology    RIGHT SHOULDER PAIN:  - Noted that the patient reports some right shoulder pain.  - Referred the patient to physical therapy for shoulder pain.    CARPAL TUNNEL SYNDROME:  - Noted that the patient reports numbness sensation of her right hand from the 3rd, 4th, and 5th digits.    LOW BACK PAIN:  - Noted that the patient reports difficulty with household chores due to back pain, including cleaning, mowing the lawn, and general house maintenance.  - Observed that the patient sleeps on her recliner, which helps with her back pain.  - Referred the patient to physical therapy for back pain.    CUTANEOUS ABSCESS (SKIN BOILS):  - Noted the patient's history of recurrent skin boils, especially around her skin folds.  - Prescribed clindamycin topical solution for skin boils, to be used as needed.    CANDIDIASIS OF SKIN:  - Noted that the patient reports frequent yeast infections in her paniculus.  - Prescribed nystatin powder for skin folds, to be used as needed.  - Prescribed ketoconazole cream for skin folds, to be used as needed.  - Recommend using panty liners in her  "paniculus skin folds to help reduce skin moisture.    EXCESSIVE AND REDUNDANT SKIN:  - Noted that the patient has a large pendulous abdomen with prominent skin folds due to significant weight loss over the years.    CORNS AND CALLOSITIES:  - Noted that the patient has a large corn/discomfort/mass on her feet that significantly impairs her ability to walk.  - Advised the patient to schedule an appointment with a podiatrist for further evaluation and treatment.  - keep using topical salicylic acid for corn/mass of the plantar surface of foot    CHRONIC PAIN MANAGEMENT:  - Continued Lyrica 200 mg TID.  - Continued ibuprofen.  - Discussed with the patient that she has reached the maximum dosage on multiple pain medications, including Lyrica 200 mg TID and ibuprofen.    ERYTHEMA INTERTRIGO:  - Noted that the patient reports constant itching and pain in the abdominal area.  - Recommend using tanning liners in her paniculus skin folds to address intertrigo and help reduce skin moisture.  - Advised using nystatin powder and ketoconazole cream as needed for symptom relief.    STRESS MANAGEMENT:  - Advised the patient to try to decrease her stress levels amidst multiple stressors.  - Recommend scheduling a designated worry time during the day.  - Encouraged the patient to try to adopt a different perspective on her life circumstances.  - Recommend stress reduction strategies and stress management techniques to manage multiple stressors.  - [place] to schedule a dedicated "worry time" during the day to manage stress.  - Explained perspective shift regarding life circumstances.    FOLLOW-UP:  - Confirmed that the patient has a rheumatologist for her rheumatoid   arthritis.         Follow Up:   No follow-ups on file.    Other Orders Placed This Visit:  Orders Placed This Encounter    Ambulatory Referral/Consult to Physical Therapy    clindamycin (CLEOCIN) 75 mg/5 mL SolR    nystatin (MYCOSTATIN) powder    ketoconazole (NIZORAL) 2 " % cream         Jeremiah Varner MD        This note is dictated on M*Modal word recognition program and This note was generated with the assistance of ambient listening technology. Verbal consent was obtained by the patient and accompanying visitor(s) for the recording of patient appointment to facilitate this note. I attest to having reviewed and edited the generated note for accuracy, though some syntax or spelling errors may persist. Please contact the author of this note for any clarification.  .  There are word recognition mistakes that are occasionally missed on review.           [1]   Patient Active Problem List  Diagnosis    Psoriatic arthritis    Osteoarthritis of lumbar spine    Hypothyroidism    ESR raised    Endometriosis of uterus    Generalized abdominal pain    Morbid obesity    Hypokalemia    Chest tightness

## 2025-04-25 ENCOUNTER — PATIENT MESSAGE (OUTPATIENT)
Dept: PRIMARY CARE CLINIC | Facility: CLINIC | Age: 50
End: 2025-04-25
Payer: MEDICAID

## 2025-04-28 RX ORDER — ONDANSETRON 8 MG/1
8 TABLET, ORALLY DISINTEGRATING ORAL EVERY 6 HOURS PRN
Qty: 30 TABLET | Refills: 2 | Status: SHIPPED | OUTPATIENT
Start: 2025-04-28

## 2025-04-28 NOTE — TELEPHONE ENCOUNTER
No care due was identified.  [facility] Embedded Care Due Messages. Reference number: [id number].   4/28/2025 3:57:50 PM CDT

## 2025-05-19 ENCOUNTER — PATIENT MESSAGE (OUTPATIENT)
Dept: RHEUMATOLOGY | Facility: CLINIC | Age: 50
End: 2025-05-19
Payer: MEDICAID

## 2025-05-19 DIAGNOSIS — G89.4 CHRONIC PAIN SYNDROME: ICD-10-CM

## 2025-05-19 DIAGNOSIS — M06.9 RHEUMATOID ARTHRITIS, INVOLVING UNSPECIFIED SITE, UNSPECIFIED WHETHER RHEUMATOID FACTOR PRESENT: ICD-10-CM

## 2025-05-20 RX ORDER — PREGABALIN 100 MG/1
200 CAPSULE ORAL 3 TIMES DAILY
Qty: 180 CAPSULE | Refills: 5 | Status: SHIPPED | OUTPATIENT
Start: 2025-05-20 | End: 2025-11-18

## 2025-05-23 ENCOUNTER — PATIENT MESSAGE (OUTPATIENT)
Dept: RHEUMATOLOGY | Facility: CLINIC | Age: 50
End: 2025-05-23
Payer: MEDICAID

## 2025-05-26 ENCOUNTER — DOCUMENTATION ONLY (OUTPATIENT)
Dept: RHEUMATOLOGY | Facility: CLINIC | Age: 50
End: 2025-05-26
Payer: MEDICAID

## 2025-05-26 DIAGNOSIS — G89.4 CHRONIC PAIN SYNDROME: ICD-10-CM

## 2025-05-26 DIAGNOSIS — M06.9 RHEUMATOID ARTHRITIS, INVOLVING UNSPECIFIED SITE, UNSPECIFIED WHETHER RHEUMATOID FACTOR PRESENT: ICD-10-CM

## 2025-05-26 RX ORDER — PREGABALIN 100 MG/1
200 CAPSULE ORAL 3 TIMES DAILY
Qty: 180 CAPSULE | Refills: 5 | Status: CANCELLED | OUTPATIENT
Start: 2025-05-26 | End: 2025-11-24

## 2025-05-26 NOTE — PROGRESS NOTES
Spoke to Dr. Madera regarding Lyrica clarification. Lyrica 200mg PO TID ordered. Iris Rivas updated on medication dosage change.

## 2025-07-09 ENCOUNTER — PATIENT MESSAGE (OUTPATIENT)
Dept: ADMINISTRATIVE | Facility: HOSPITAL | Age: 50
End: 2025-07-09
Payer: MEDICAID

## 2025-07-09 ENCOUNTER — PATIENT OUTREACH (OUTPATIENT)
Dept: ADMINISTRATIVE | Facility: HOSPITAL | Age: 50
End: 2025-07-09
Payer: MEDICAID

## 2025-07-09 NOTE — PROGRESS NOTES
Health Maintenance Due   Topic Date Due    Pneumococcal Vaccines (Age 50+) (1 of 2 - PCV) Never done    Mammogram  01/23/2019    Colorectal Cancer Screening  Never done    Shingles Vaccine (1 of 2) Never done    COVID-19 Vaccine (1 - 2024-25 season) Never done    Hemoglobin A1c (Diabetic Prevention Screening)  01/20/2025     L/M for scheduling mammogram  Reminded to return cologuard or replacement  Not eligible for digital medicine   Recent b/p reading or nurse visit

## 2025-07-10 RX ORDER — ONDANSETRON 8 MG/1
TABLET, ORALLY DISINTEGRATING ORAL
Qty: 30 TABLET | Refills: 2 | Status: SHIPPED | OUTPATIENT
Start: 2025-07-10

## 2025-07-10 NOTE — TELEPHONE ENCOUNTER
No care due was identified.  [facility] Embedded Care Due Messages. Reference number: [id number].   7/10/2025 4:30:48 PM CDT

## 2025-07-21 RX ORDER — IBUPROFEN 800 MG/1
800 TABLET, FILM COATED ORAL 3 TIMES DAILY
Qty: 90 TABLET | Refills: 3 | Status: SHIPPED | OUTPATIENT
Start: 2025-07-21

## 2025-08-21 DIAGNOSIS — L40.9 PSORIASIS: ICD-10-CM

## 2025-08-21 RX ORDER — SUMATRIPTAN SUCCINATE 100 MG/1
TABLET ORAL
Qty: 27 TABLET | Refills: 0 | Status: SHIPPED | OUTPATIENT
Start: 2025-08-21

## 2025-08-25 RX ORDER — CLOBETASOL PROPIONATE 0.5 MG/G
OINTMENT TOPICAL 2 TIMES DAILY
Qty: 60 G | Refills: 3 | Status: SHIPPED | OUTPATIENT
Start: 2025-08-25 | End: 2026-02-21